# Patient Record
Sex: MALE | Race: WHITE
[De-identification: names, ages, dates, MRNs, and addresses within clinical notes are randomized per-mention and may not be internally consistent; named-entity substitution may affect disease eponyms.]

---

## 2018-07-30 ENCOUNTER — HOSPITAL ENCOUNTER (EMERGENCY)
Dept: HOSPITAL 26 - MED | Age: 28
Discharge: HOME | End: 2018-07-30
Payer: SELF-PAY

## 2018-07-30 VITALS — BODY MASS INDEX: 39.4 KG/M2 | WEIGHT: 260 LBS | HEIGHT: 68 IN

## 2018-07-30 VITALS — DIASTOLIC BLOOD PRESSURE: 81 MMHG | SYSTOLIC BLOOD PRESSURE: 116 MMHG

## 2018-07-30 VITALS — DIASTOLIC BLOOD PRESSURE: 56 MMHG | SYSTOLIC BLOOD PRESSURE: 99 MMHG
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
## 2018-07-30 DIAGNOSIS — R07.89: Primary | ICD-10-CM
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
## 2018-07-30 PROCEDURE — 93005 ELECTROCARDIOGRAM TRACING: CPT

## 2018-07-30 PROCEDURE — 96374 THER/PROPH/DIAG INJ IV PUSH: CPT

## 2018-07-30 PROCEDURE — 99284 EMERGENCY DEPT VISIT MOD MDM: CPT

## 2018-07-30 PROCEDURE — 71045 X-RAY EXAM CHEST 1 VIEW: CPT

## 2019-06-30 ENCOUNTER — HOSPITAL ENCOUNTER (EMERGENCY)
Dept: HOSPITAL 1 - ED | Age: 29
Discharge: HOME | End: 2019-06-30
Payer: MEDICAID

## 2019-06-30 VITALS
HEIGHT: 71 IN | WEIGHT: 230 LBS | WEIGHT: 230 LBS | HEIGHT: 71 IN | BODY MASS INDEX: 32.2 KG/M2 | BODY MASS INDEX: 32.2 KG/M2

## 2019-06-30 VITALS — SYSTOLIC BLOOD PRESSURE: 147 MMHG | DIASTOLIC BLOOD PRESSURE: 84 MMHG
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
## 2019-06-30 DIAGNOSIS — S93.402A: Primary | ICD-10-CM
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
## 2019-06-30 DIAGNOSIS — Z90.49: ICD-10-CM
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
## 2019-07-01 ENCOUNTER — HOSPITAL ENCOUNTER (EMERGENCY)
Dept: HOSPITAL 1 - ED | Age: 29
Discharge: HOME | End: 2019-07-01
Payer: MEDICAID

## 2019-07-01 VITALS — WEIGHT: 228 LBS | HEIGHT: 70 IN | BODY MASS INDEX: 32.64 KG/M2

## 2019-07-01 VITALS — DIASTOLIC BLOOD PRESSURE: 89 MMHG | SYSTOLIC BLOOD PRESSURE: 145 MMHG
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
## 2019-07-01 DIAGNOSIS — S93.402D: Primary | ICD-10-CM

## 2019-07-01 DIAGNOSIS — Z13.828: ICD-10-CM

## 2019-07-01 DIAGNOSIS — R03.0: ICD-10-CM

## 2019-07-02 ENCOUNTER — HOSPITAL ENCOUNTER (EMERGENCY)
Dept: HOSPITAL 1 - ED | Age: 29
Discharge: HOME | End: 2019-07-02
Payer: MEDICAID

## 2019-07-02 VITALS — BODY MASS INDEX: 31.95 KG/M2 | HEIGHT: 71 IN | WEIGHT: 228.25 LBS

## 2019-07-02 VITALS — DIASTOLIC BLOOD PRESSURE: 64 MMHG | SYSTOLIC BLOOD PRESSURE: 122 MMHG

## 2019-07-02 DIAGNOSIS — S99.912D: Primary | ICD-10-CM
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
## 2019-08-04 ENCOUNTER — HOSPITAL ENCOUNTER (INPATIENT)
Dept: HOSPITAL 1 - ED | Age: 29
LOS: 2 days | Discharge: LEFT BEFORE DISCHARGE | DRG: 243 | End: 2019-08-06
Attending: INTERNAL MEDICINE | Admitting: INTERNAL MEDICINE
Payer: MEDICAID

## 2019-08-04 VITALS
WEIGHT: 233 LBS | BODY MASS INDEX: 32.62 KG/M2 | BODY MASS INDEX: 32.62 KG/M2 | HEIGHT: 71 IN | WEIGHT: 233 LBS | HEIGHT: 71 IN

## 2019-08-04 VITALS — DIASTOLIC BLOOD PRESSURE: 56 MMHG | SYSTOLIC BLOOD PRESSURE: 105 MMHG

## 2019-08-04 VITALS — DIASTOLIC BLOOD PRESSURE: 82 MMHG | SYSTOLIC BLOOD PRESSURE: 129 MMHG

## 2019-08-04 DIAGNOSIS — K29.70: ICD-10-CM

## 2019-08-04 DIAGNOSIS — K21.9: Primary | ICD-10-CM

## 2019-08-04 LAB
ALBUMIN SERPL-MCNC: 3.9 G/DL (ref 3.4–5)
ALP SERPL-CCNC: 125 U/L (ref 46–116)
ALT SERPL-CCNC: 308 U/L (ref 16–63)
AMPHETAMINES UR QL SCN: (no result)
AST SERPL-CCNC: 249 U/L (ref 15–37)
BASOPHILS NFR BLD: 0.7 % (ref 0–2)
BILIRUB SERPL-MCNC: 1.3 MG/DL (ref 0.2–1)
BUN SERPL-MCNC: 13 MG/DL (ref 7–18)
CALCIUM SERPL-MCNC: 9 MG/DL (ref 8.5–10.1)
CHLORIDE SERPL-SCNC: 102 MMOL/L (ref 98–107)
CO2 SERPL-SCNC: 30 MMOL/L (ref 21–32)
CREAT SERPL-MCNC: 0.8 MG/DL (ref 0.7–1.3)
ERYTHROCYTE [DISTWIDTH] IN BLOOD BY AUTOMATED COUNT: 13.7 % (ref 11.5–14.5)
GFR SERPLBLD BASED ON 1.73 SQ M-ARVRAT: > 60 ML/MIN
GLUCOSE SERPL-MCNC: 93 MG/DL (ref 74–106)
LIPASE SERPL-CCNC: 118 IU/L (ref 73–393)
MICROSCOPIC UR-IMP: YES
PLATELET # BLD: 181 X10^3MCL (ref 130–400)
POTASSIUM SERPL-SCNC: 4.1 MMOL/L (ref 3.5–5.1)
PROT SERPL-MCNC: 8.1 G/DL (ref 6.4–8.2)
RBC # UR STRIP.AUTO: NEGATIVE /UL
SODIUM SERPL-SCNC: 141 MMOL/L (ref 136–145)
UA SPECIFIC GRAVITY: 1.01 (ref 1–1.03)

## 2019-08-04 PROCEDURE — G0378 HOSPITAL OBSERVATION PER HR: HCPCS

## 2019-08-04 NOTE — NUR
RECIEVED PT FROM DAY NURSE. PT RESTING IN BED COMFORTABLY. DENIES PAIN AT THIS
TIME. A/O X4, CALM AND COOPERATIVE. MS PT, DENIES CP, N/V, DIZZINESS, AND
PALPATATIONS. PALPABLE PULSES, NO EDEMA NOTED. BREATHING EVEN AND UNLABORED,
DENIES SOB. ABD SOFT AND ROUND, EPIGASTRIC PAIN TO PALPATION. LAC IV, CDI AND
INFUSING. BED AT LOWEST POSITION, CALL LIGHT WITHIN REACH, WILL CONTINUE TO
MONITOR.

## 2019-08-04 NOTE — NUR
PT RESTING IN BED TALKING ON THE PHONE. PT STATES ABD PAIN IS TOLERABLE AT
THIS TIME. IV INTACT AND PATENT. CALL LIGHT WITHIN REACH. WILL CONTINUE TO
MONITOR.

## 2019-08-04 NOTE — NUR
RECEIVED PT FROM ER, PT ADMIT FOR RECTAL BLEED, ABD PAIN, PT IS A/O X4, VERBAL
RESPONSIVE, ABLE TO TELL WHAT HE NEEDS. LUNG SOUND CLEAR BILATERAL, NO COUGH,
NO SOB, PT DENY ANY CHEST PAIN, BOWEL SOUND PRESENT ALL 4 QUADRANTS, NO
DISTENTION, PT C/O ABD PAIN AT MID AND RADIATE TO RIGHT UPPER QUARANT, ALSO PT
COMPLAIN HAD BLACK STOOL DAYS AGO. PEDAL PULSE PRESENT BOTH FEET, NO EDEMA, IV
AT LEFT AC, NO LEAKING, NO INFILTRATION.ALL ADLS ASSIST, ALL NEED MET, CALL
LIGHT IN REACH, WILL CONTINUE TO MONITOR.

## 2019-08-05 VITALS — SYSTOLIC BLOOD PRESSURE: 113 MMHG | DIASTOLIC BLOOD PRESSURE: 72 MMHG

## 2019-08-05 VITALS — SYSTOLIC BLOOD PRESSURE: 122 MMHG | DIASTOLIC BLOOD PRESSURE: 76 MMHG

## 2019-08-05 VITALS — DIASTOLIC BLOOD PRESSURE: 73 MMHG | SYSTOLIC BLOOD PRESSURE: 119 MMHG

## 2019-08-05 VITALS — DIASTOLIC BLOOD PRESSURE: 74 MMHG | SYSTOLIC BLOOD PRESSURE: 126 MMHG

## 2019-08-05 LAB
BASOPHILS NFR BLD: 0.4 % (ref 0–2)
BUN SERPL-MCNC: 7 MG/DL (ref 7–18)
CALCIUM SERPL-MCNC: 8.5 MG/DL (ref 8.5–10.1)
CHLORIDE SERPL-SCNC: 104 MMOL/L (ref 98–107)
CO2 SERPL-SCNC: 24.5 MMOL/L (ref 21–32)
CREAT SERPL-MCNC: 0.8 MG/DL (ref 0.7–1.3)
ERYTHROCYTE [DISTWIDTH] IN BLOOD BY AUTOMATED COUNT: 13.1 % (ref 11.5–14.5)
GFR SERPLBLD BASED ON 1.73 SQ M-ARVRAT: > 60 ML/MIN
GLUCOSE SERPL-MCNC: 79 MG/DL (ref 74–106)
PLATELET # BLD: 146 X10^3MCL (ref 130–400)
POTASSIUM SERPL-SCNC: 3.4 MMOL/L (ref 3.5–5.1)
SODIUM SERPL-SCNC: 140 MMOL/L (ref 136–145)

## 2019-08-05 NOTE — NUR
PT RESTING IN BED AT THIS TIME. DENIES PAIN OR DISCOMFORT. BREATHING E/U ON
RA. NO SIGNS OF ACUTE DISTRESS AT THIS TIME. BED AT LOWEST POSITION. CALL
LIGHT WITHIN REACH. WILL CONTINUE TO MONITOR.

## 2019-08-05 NOTE — NUR
PT RESTING IN BED COMFORTABLY. NO S/S OF PAIN AT THIS TIME. BREATHING E/U. NO
SIGNIFICANT CHANGES THIS THIS. BED AT LOWEST POSITION. CALL LIGHT WITHIN
REACH. WILL ENDORSE TO DAY NURSE.

## 2019-08-05 NOTE — NUR
RECEIVED PT IN BED, RESTING QUIETLY. A/O X4. RESP. EVEN AND UNLABORED. ON ROOM
AIR, NO ACUTE DISTRESS NOTED. AFEBRILE AND VITAL SIGNS STABLE. DENIES CP ,
ABD. PAIN OR ANY DISCOMFORT AT THIS TIME. IVF, NS AT 100ML/HR, INTACT AND
INFUSING VIA LAC, SITE CLEAR. AMBULATORY. NO COMPLAINTS NOTED AT THIS TIME.
CALL LIGHT WITHIN REACH. WILL CONTINUE TO MONITOR.

## 2019-08-05 NOTE — NUR
PT ENDORSE TO ME THIS MORNING. LAYING IN BED RESTING, AA/ OX4, BREATHING EVEN
AND AND UNLABORED ON RA, NO ACUTE RESP DISTRESS OR SOB NOTED. MEDSURG,DENIES
ANY CP OR PRESSURE. LAST BM 8/4 FORMED PER NIGHT NURSE. VOIDS FREELY. AMB. IV
TO THE RAC INTACT AND PATENT, INFUSING AT 100ML/ HR, NO REDNESS OR SWELLING
NOTED, WILL CONTINUE TO MONITOR.

## 2019-08-05 NOTE — NUR
FOR PROCEDURE IN AM. CONSENT OBTAINED FROM PT. PT ALSO INSTRUCTED TO MAINTAIN
NPO AFTER MN. PT VERBALIZED UNDERSTANDING. WILL CONTINUE TO MONITOR.

## 2019-08-06 VITALS — DIASTOLIC BLOOD PRESSURE: 73 MMHG | SYSTOLIC BLOOD PRESSURE: 127 MMHG

## 2019-08-06 VITALS — SYSTOLIC BLOOD PRESSURE: 120 MMHG | DIASTOLIC BLOOD PRESSURE: 79 MMHG

## 2019-08-06 LAB
ALBUMIN SERPL-MCNC: 3.5 G/DL (ref 3.4–5)
ALP SERPL-CCNC: 94 U/L (ref 46–116)
ALT SERPL-CCNC: 397 U/L (ref 16–63)
AST SERPL-CCNC: 328 U/L (ref 15–37)
BASOPHILS NFR BLD: 0.4 % (ref 0–2)
BILIRUB DIRECT SERPL-MCNC: 0.78 MG/DL (ref 0–0.2)
BILIRUB SERPL-MCNC: 1.6 MG/DL (ref 0.2–1)
BUN SERPL-MCNC: 8 MG/DL (ref 7–18)
CALCIUM SERPL-MCNC: 9.2 MG/DL (ref 8.5–10.1)
CHLORIDE SERPL-SCNC: 103 MMOL/L (ref 98–107)
CO2 SERPL-SCNC: 28.3 MMOL/L (ref 21–32)
CREAT SERPL-MCNC: 0.9 MG/DL (ref 0.7–1.3)
ERYTHROCYTE [DISTWIDTH] IN BLOOD BY AUTOMATED COUNT: 13.3 % (ref 11.5–14.5)
GFR SERPLBLD BASED ON 1.73 SQ M-ARVRAT: > 60 ML/MIN
GLUCOSE SERPL-MCNC: 84 MG/DL (ref 74–106)
PLATELET # BLD: 157 X10^3MCL (ref 130–400)
POTASSIUM SERPL-SCNC: 4 MMOL/L (ref 3.5–5.1)
PROT SERPL-MCNC: 7.4 G/DL (ref 6.4–8.2)
SODIUM SERPL-SCNC: 140 MMOL/L (ref 136–145)

## 2019-08-06 PROCEDURE — 0DB78ZX EXCISION OF STOMACH, PYLORUS, VIA NATURAL OR ARTIFICIAL OPENING ENDOSCOPIC, DIAGNOSTIC: ICD-10-PCS | Performed by: INTERNAL MEDICINE

## 2019-08-06 NOTE — NUR
PATIENT REQUESTING TO BE DISCHARGED HOME. SAMEER[facility] CLEMENT CALLED, PER NP PATIENT IS
NOT GOING TO BE DISCHARGED TODAY, IF PATIENT WANTS TO LEAVE HE WILL NEED TO
SIGN OUT AMA. PATIENT INFORMED, AND REQUESTED TO SIGN OUT AMA. AMA FORM
SIGNED. BONITA THOMPSON'JOSE JUAN. PATIENT LEFT AT THIS TIME. PATIENT'S SISTER TO PICK HIM UP TO
TAKE HIM HOME.

## 2019-08-06 NOTE — NUR
PT DOOZING OFF AND ON AT THIS TIME. STATES PAIN RELIEF. CALL LIGHT WITHIN
REACH. WILL CONTINUE TO MONITOR.

## 2019-08-06 NOTE — NUR
PATIENT GOING DOWN TO GI LAB FOR EGD VIA [facility] AT THIS TIME. IV HEPLOCKED,
PATIENT DENIES ANY PAIN OR RECTAL BLEEDING. NPO SINCE MIDNOC. WILL CONTINUE TO
MONITOR UPON RETURN TO THE FLOOR

## 2019-08-06 NOTE — NUR
SLEPT WELL. NO COMPLAINTS NOTED AT THIS TIME. AFEBRILE AND VITAL SIGNS
STABLE.RESP. EVEN AND UNLABORED. NO ACUTE DISTRESS NOTED. NPO MAINTAINED. FOR
PROCEDURE THIS AM. IVF INTACT AND INFUSING WELL. WILL ENDORSE TO INCOMING
NURSE.

## 2019-08-06 NOTE — NUR
PATIENT RETURNED FROM G.I. LAB VIA GURNEY. AWAKE, ALERT DENIES ANY PAIN OR
DISCOMFORT. NO N/V OR RECTAL BLEEDING NOTED. PATIENT NPO UNTIL LUNCH TODAY.
IVF DC'D AS ORDERED. PATIENT INSTRUCTED TO CALL FOR ASSIST.

## 2019-08-06 NOTE — NUR
PATIENT'S PLAN OF CARE WAS DISCUSSED AND REVIEWED WITH LVN:BARAK BERMUDEZ.
I HAVE REVIEWED THE DATA COLLECTION BY LVN (NAME):BARAK BERMUDEZ
ENTERED ON (DATE/TIME):8/6/19.
 
I CONCUR WITH THE DATA AND ANY EXCEPTIONS OR COMMENTS ARE LISTED BELOW:

## 2019-08-06 NOTE — NUR
RECEIVED PATIENT IN BED APPEARS TO BE RESTING, AROUSED EASILY TO NAME. NPO FOR
EGD THIS AM. PATIENT DENIES ANY RECTAL BLEEDING OR PAIN AT THIS TIME. IVF
INFUSIG WELL TO LEFT A/C. WILL CONTINUE TO MONITOR.

## 2019-08-06 NOTE — NUR
Initial Nutrition Assessment: 257/B TRICIA SALTER HR
 
Dx: Rectal bleed, abd pain
PMHx: none
PSHx: Cholecystectomy
Labs: AST 328H, ALT 397H
Meds: Pepcid, toradol, zofran
Diet: Regular
PO Intake: (8/4) 50% lunch, 100% breakfast (on clear liquid)
Ht: 180.34 cm (71")   Wt: 105.6 kg (232#)  BMI: 32.5 kg/m2Bed scale: 105.5 kg
IBW: 172# (78 kg) %IBW: 134  UBW: unable to access
Age: 29/M
Food Allergies: NKFA
Skin: intact  Coy: 23
Edema: none
GI: Last BM: 8/4
Trigger: N/V/D >3d, poor PO >3d
 
Per H&P, Pt is a 29-year old male who was seen and examined in the ER.
According to patient for about 2 weeks he has been having epigastric pain
associated with nausea and vomiting. Patient states that he noticed that he
was having some dark stools. Patient was states that he eats fast food and
puts lot of chili onto his food.
 
RDN Visit (8/6): Went twice to patient's room but patient was sleeping. Spoke
with RN Jose Daniel, she said that patient tolerated regular diet very well and
does not have any N/V/D/C. Patient has good appetite.
 
Problem with:  N/V/D/C: no
Problems with: Chewing/Swallowing: none
Current appetite: good (per RN)
Recent wt change: unable to access  %wt change: n/a
Vitamin/Supplement use: unable to access
Special diet at home: unable to access
Physical activity: unable to access
Nutrition education given: not possible at this time as patient was sleeping.
Food-drug interactions: none Education given: n/a
 
Estimated Nutritional Needs Based on adjusted body weight 85 kg
 Energy: 6571-1385 kcal/d (25-30 kcal/kg)
 Protein:  g/d (1.0-1.2 g/kg) - preserve LBM
 Fluid: 7887-9255 ml/d (1 ml/kcal) or per doctor
 
Nutrition Diagnosis
1. Altered nutrition related lab values related to endocrine dysfunction as
evidenced by AST 328H
Intervention
1. Recommend continuing regular diet as tolerated.
Monitor/Evaluate
 Goal: PO intake at least 75% of estimated needs
 Monitor: PO intake, Labs, GI function
 F/U in 7 days as low risk 8/13

## 2019-08-06 NOTE — NUR
RESTING QUIETLY IN BED, WITH EYES CLOSED, APPEARS ASLEEP, EASILY AROUSABLE.
RESP. EVEN AND UNLABORED. NO ACUTE DISTRESS NOTED. IVF INTACT AND INFUSING
WELL, SITE CLEAR. CALL LIGHT WITHIN REACH. WILL CONTINUE TO MONITOR.

## 2020-03-06 ENCOUNTER — HOSPITAL ENCOUNTER (EMERGENCY)
Dept: HOSPITAL 1 - ED | Age: 30
Discharge: HOME | End: 2020-03-06
Payer: COMMERCIAL

## 2020-03-06 VITALS — SYSTOLIC BLOOD PRESSURE: 126 MMHG | DIASTOLIC BLOOD PRESSURE: 63 MMHG

## 2020-03-06 VITALS
WEIGHT: 228 LBS | BODY MASS INDEX: 34.56 KG/M2 | HEIGHT: 68 IN | HEIGHT: 68 IN | WEIGHT: 228 LBS | BODY MASS INDEX: 34.56 KG/M2

## 2020-03-06 DIAGNOSIS — K29.20: Primary | ICD-10-CM

## 2020-03-06 LAB
ALBUMIN SERPL-MCNC: 3.5 G/DL (ref 3.4–5)
ALP SERPL-CCNC: 161 U/L (ref 46–116)
ALT SERPL-CCNC: 231 U/L (ref 16–63)
AST SERPL-CCNC: 353 U/L (ref 15–37)
BASOPHILS NFR BLD: 0.1 % (ref 0–2)
BILIRUB SERPL-MCNC: 1.5 MG/DL (ref 0.2–1)
BUN SERPL-MCNC: 8 MG/DL (ref 7–18)
CALCIUM SERPL-MCNC: 9 MG/DL (ref 8.5–10.1)
CHLORIDE SERPL-SCNC: 98 MMOL/L (ref 98–107)
CO2 SERPL-SCNC: 28.8 MMOL/L (ref 21–32)
CREAT SERPL-MCNC: 0.9 MG/DL (ref 0.7–1.3)
ERYTHROCYTE [DISTWIDTH] IN BLOOD BY AUTOMATED COUNT: 14.1 % (ref 11.5–14.5)
GFR SERPLBLD BASED ON 1.73 SQ M-ARVRAT: > 60 ML/MIN
GLUCOSE SERPL-MCNC: 132 MG/DL (ref 74–106)
LIPASE SERPL-CCNC: 118 IU/L (ref 73–393)
PLATELET # BLD: 163 X10^3MCL (ref 130–400)
POTASSIUM SERPL-SCNC: 3.9 MMOL/L (ref 3.5–5.1)
PROT SERPL-MCNC: 9.6 G/DL (ref 6.4–8.2)
SODIUM SERPL-SCNC: 137 MMOL/L (ref 136–145)

## 2020-04-23 ENCOUNTER — HOSPITAL ENCOUNTER (INPATIENT)
Dept: HOSPITAL 1 - ED | Age: 30
LOS: 2 days | Discharge: HOME | DRG: 280 | End: 2020-04-25
Attending: INTERNAL MEDICINE | Admitting: INTERNAL MEDICINE
Payer: COMMERCIAL

## 2020-04-23 VITALS
HEIGHT: 71 IN | WEIGHT: 226 LBS | WEIGHT: 226 LBS | BODY MASS INDEX: 31.64 KG/M2 | HEIGHT: 71 IN | BODY MASS INDEX: 31.64 KG/M2

## 2020-04-23 VITALS — SYSTOLIC BLOOD PRESSURE: 126 MMHG | DIASTOLIC BLOOD PRESSURE: 80 MMHG
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
## 2020-04-23 DIAGNOSIS — K70.30: ICD-10-CM

## 2020-04-23 DIAGNOSIS — K70.10: Primary | ICD-10-CM

## 2020-04-23 DIAGNOSIS — F10.20: ICD-10-CM

## 2020-04-23 DIAGNOSIS — Z90.49: ICD-10-CM

## 2020-04-23 LAB
ALBUMIN SERPL-MCNC: 2.4 G/DL (ref 3.4–5)
ALP SERPL-CCNC: 299 U/L (ref 46–116)
ALT SERPL-CCNC: 52 U/L (ref 16–63)
AST SERPL-CCNC: 264 U/L (ref 15–37)
BASOPHILS NFR BLD: 0.7 % (ref 0–2)
BILIRUB SERPL-MCNC: 6.63 MG/DL (ref 0.2–1)
BUN SERPL-MCNC: 9 MG/DL (ref 7–18)
CALCIUM SERPL-MCNC: 7.8 MG/DL (ref 8.5–10.1)
CHLORIDE SERPL-SCNC: 99 MMOL/L (ref 98–107)
CO2 SERPL-SCNC: 27.3 MMOL/L (ref 21–32)
CREAT SERPL-MCNC: 0.8 MG/DL (ref 0.7–1.3)
ERYTHROCYTE [DISTWIDTH] IN BLOOD BY AUTOMATED COUNT: 16.9 % (ref 11.5–14.5)
GFR SERPLBLD BASED ON 1.73 SQ M-ARVRAT: > 60 ML/MIN
GLUCOSE SERPL-MCNC: 82 MG/DL (ref 74–106)
LIPASE SERPL-CCNC: 87 IU/L (ref 73–393)
MICROSCOPIC UR-IMP: YES
PLATELET # BLD: 234 X10^3MCL (ref 130–400)
POTASSIUM SERPL-SCNC: 3.2 MMOL/L (ref 3.5–5.1)
PROT SERPL-MCNC: 8.3 G/DL (ref 6.4–8.2)
RBC # UR STRIP.AUTO: NEGATIVE /UL
SODIUM SERPL-SCNC: 136 MMOL/L (ref 136–145)
UA SPECIFIC GRAVITY: 1.02 (ref 1–1.03)

## 2020-04-23 PROCEDURE — G0378 HOSPITAL OBSERVATION PER HR: HCPCS

## 2020-04-24 VITALS — SYSTOLIC BLOOD PRESSURE: 117 MMHG | DIASTOLIC BLOOD PRESSURE: 75 MMHG

## 2020-04-24 VITALS — DIASTOLIC BLOOD PRESSURE: 75 MMHG | SYSTOLIC BLOOD PRESSURE: 121 MMHG

## 2020-04-24 VITALS — SYSTOLIC BLOOD PRESSURE: 115 MMHG | DIASTOLIC BLOOD PRESSURE: 72 MMHG

## 2020-04-24 VITALS — SYSTOLIC BLOOD PRESSURE: 120 MMHG | DIASTOLIC BLOOD PRESSURE: 73 MMHG

## 2020-04-24 VITALS — SYSTOLIC BLOOD PRESSURE: 117 MMHG | DIASTOLIC BLOOD PRESSURE: 70 MMHG

## 2020-04-24 LAB
ALBUMIN SERPL-MCNC: 2.2 G/DL (ref 3.4–5)
ALP SERPL-CCNC: 273 U/L (ref 46–116)
ALT SERPL-CCNC: 47 U/L (ref 16–63)
AST SERPL-CCNC: 213 U/L (ref 15–37)
BILIRUB SERPL-MCNC: 7.84 MG/DL (ref 0.2–1)
BUN SERPL-MCNC: 7 MG/DL (ref 7–18)
CALCIUM SERPL-MCNC: 8.4 MG/DL (ref 8.5–10.1)
CHLORIDE SERPL-SCNC: 98 MMOL/L (ref 98–107)
CO2 SERPL-SCNC: 29.4 MMOL/L (ref 21–32)
CREAT SERPL-MCNC: 0.8 MG/DL (ref 0.7–1.3)
GFR SERPLBLD BASED ON 1.73 SQ M-ARVRAT: > 60 ML/MIN
GLUCOSE SERPL-MCNC: 124 MG/DL (ref 74–106)
POTASSIUM SERPL-SCNC: 3.9 MMOL/L (ref 3.5–5.1)
PROT SERPL-MCNC: 8.1 G/DL (ref 6.4–8.2)
SODIUM SERPL-SCNC: 133 MMOL/L (ref 136–145)

## 2020-04-25 VITALS — SYSTOLIC BLOOD PRESSURE: 112 MMHG | DIASTOLIC BLOOD PRESSURE: 66 MMHG

## 2020-04-25 VITALS — SYSTOLIC BLOOD PRESSURE: 110 MMHG | DIASTOLIC BLOOD PRESSURE: 65 MMHG

## 2020-04-25 LAB
ALBUMIN SERPL-MCNC: 2.1 G/DL (ref 3.4–5)
ALP SERPL-CCNC: 252 U/L (ref 46–116)
ALT SERPL-CCNC: 42 U/L (ref 16–63)
AST SERPL-CCNC: 175 U/L (ref 15–37)
BASOPHILS NFR BLD: 0.1 % (ref 0–2)
BILIRUB SERPL-MCNC: 6.74 MG/DL (ref 0.2–1)
BUN SERPL-MCNC: 7 MG/DL (ref 7–18)
CALCIUM SERPL-MCNC: 8.1 MG/DL (ref 8.5–10.1)
CHLORIDE SERPL-SCNC: 99 MMOL/L (ref 98–107)
CO2 SERPL-SCNC: 28.2 MMOL/L (ref 21–32)
CREAT SERPL-MCNC: 0.7 MG/DL (ref 0.7–1.3)
ERYTHROCYTE [DISTWIDTH] IN BLOOD BY AUTOMATED COUNT: 17.3 % (ref 11.5–14.5)
GFR SERPLBLD BASED ON 1.73 SQ M-ARVRAT: > 60 ML/MIN
GLUCOSE SERPL-MCNC: 97 MG/DL (ref 74–106)
PLATELET # BLD: 219 X10^3MCL (ref 130–400)
POTASSIUM SERPL-SCNC: 3.9 MMOL/L (ref 3.5–5.1)
PROT SERPL-MCNC: 7.8 G/DL (ref 6.4–8.2)
SODIUM SERPL-SCNC: 132 MMOL/L (ref 136–145)

## 2020-04-30 ENCOUNTER — HOSPITAL ENCOUNTER (EMERGENCY)
Dept: HOSPITAL 1 - ED | Age: 30
Discharge: HOME | End: 2020-04-30
Payer: COMMERCIAL

## 2020-04-30 VITALS — DIASTOLIC BLOOD PRESSURE: 70 MMHG | SYSTOLIC BLOOD PRESSURE: 119 MMHG

## 2020-04-30 VITALS
WEIGHT: 220 LBS | HEIGHT: 71 IN | BODY MASS INDEX: 30.8 KG/M2 | WEIGHT: 220 LBS | HEIGHT: 71 IN | BODY MASS INDEX: 30.8 KG/M2

## 2020-04-30 DIAGNOSIS — K70.10: Primary | ICD-10-CM

## 2020-04-30 DIAGNOSIS — Z90.49: ICD-10-CM

## 2020-04-30 LAB
ALBUMIN SERPL-MCNC: 2 G/DL (ref 3.4–5)
ALP SERPL-CCNC: 226 U/L (ref 46–116)
ALT SERPL-CCNC: 58 U/L (ref 16–63)
AST SERPL-CCNC: 186 U/L (ref 15–37)
BASOPHILS NFR BLD: 0.1 % (ref 0–2)
BILIRUB SERPL-MCNC: 11.34 MG/DL (ref 0.2–1)
BUN SERPL-MCNC: 12 MG/DL (ref 7–18)
CALCIUM SERPL-MCNC: 8.4 MG/DL (ref 8.5–10.1)
CHLORIDE SERPL-SCNC: 98 MMOL/L (ref 98–107)
CO2 SERPL-SCNC: 26.6 MMOL/L (ref 21–32)
CREAT SERPL-MCNC: 0.9 MG/DL (ref 0.7–1.3)
ERYTHROCYTE [DISTWIDTH] IN BLOOD BY AUTOMATED COUNT: 17.8 % (ref 11.5–14.5)
GFR SERPLBLD BASED ON 1.73 SQ M-ARVRAT: > 60 ML/MIN
GLUCOSE SERPL-MCNC: 97 MG/DL (ref 74–106)
PLATELET # BLD: 302 X10^3MCL (ref 130–400)
POTASSIUM SERPL-SCNC: 3.6 MMOL/L (ref 3.5–5.1)
PROT SERPL-MCNC: 7.9 G/DL (ref 6.4–8.2)
SODIUM SERPL-SCNC: 131 MMOL/L (ref 136–145)

## 2020-05-11 ENCOUNTER — HOSPITAL ENCOUNTER (INPATIENT)
Dept: HOSPITAL 1 - ED | Age: 30
LOS: 6 days | Discharge: LEFT BEFORE DISCHARGE | DRG: 720 | End: 2020-05-17
Attending: INTERNAL MEDICINE | Admitting: INTERNAL MEDICINE
Payer: COMMERCIAL

## 2020-05-11 VITALS
HEIGHT: 71 IN | WEIGHT: 180 LBS | BODY MASS INDEX: 25.2 KG/M2 | WEIGHT: 180 LBS | HEIGHT: 71 IN | BODY MASS INDEX: 25.2 KG/M2

## 2020-05-11 VITALS — DIASTOLIC BLOOD PRESSURE: 61 MMHG | SYSTOLIC BLOOD PRESSURE: 112 MMHG

## 2020-05-11 VITALS — SYSTOLIC BLOOD PRESSURE: 93 MMHG | DIASTOLIC BLOOD PRESSURE: 53 MMHG

## 2020-05-11 VITALS — DIASTOLIC BLOOD PRESSURE: 53 MMHG | SYSTOLIC BLOOD PRESSURE: 93 MMHG

## 2020-05-11 DIAGNOSIS — K70.10: ICD-10-CM

## 2020-05-11 DIAGNOSIS — Z53.29: ICD-10-CM

## 2020-05-11 DIAGNOSIS — Z20.828: ICD-10-CM

## 2020-05-11 DIAGNOSIS — J18.9: ICD-10-CM

## 2020-05-11 DIAGNOSIS — A41.9: Primary | ICD-10-CM

## 2020-05-11 DIAGNOSIS — Z90.49: ICD-10-CM

## 2020-05-11 LAB
ALBUMIN SERPL-MCNC: 1.4 G/DL (ref 3.4–5)
ALP SERPL-CCNC: 174 U/L (ref 46–116)
ALT SERPL-CCNC: 42 U/L (ref 16–63)
AST SERPL-CCNC: 151 U/L (ref 15–37)
BASOPHILS NFR BLD: 0 % (ref 0–2)
BILIRUB SERPL-MCNC: 15.5 MG/DL (ref 0.2–1)
BUN SERPL-MCNC: 8 MG/DL (ref 7–18)
CALCIUM SERPL-MCNC: 7.9 MG/DL (ref 8.5–10.1)
CHLORIDE SERPL-SCNC: 98 MMOL/L (ref 98–107)
CO2 SERPL-SCNC: 26.4 MMOL/L (ref 21–32)
CREAT SERPL-MCNC: 1 MG/DL (ref 0.7–1.3)
ERYTHROCYTE [DISTWIDTH] IN BLOOD BY AUTOMATED COUNT: 18.6 % (ref 11.5–14.5)
GFR SERPLBLD BASED ON 1.73 SQ M-ARVRAT: > 60 ML/MIN
GLUCOSE SERPL-MCNC: 108 MG/DL (ref 74–106)
LIPASE SERPL-CCNC: 141 IU/L (ref 73–393)
MONOCYTES NFR BLD: 3 % (ref 0–7)
NEUTS BAND NFR BLD: 3 % (ref 0–10)
NEUTS SEG NFR BLD MANUAL: 88 % (ref 37–75)
PLATELET # BLD: 407 X10^3MCL (ref 130–400)
POTASSIUM SERPL-SCNC: 3.8 MMOL/L (ref 3.5–5.1)
PROT SERPL-MCNC: 7.2 G/DL (ref 6.4–8.2)
RBC MORPH BLD: (no result)
SODIUM SERPL-SCNC: 132 MMOL/L (ref 136–145)

## 2020-05-11 PROCEDURE — G0480 DRUG TEST DEF 1-7 CLASSES: HCPCS

## 2020-05-11 PROCEDURE — P9047 ALBUMIN (HUMAN), 25%, 50ML: HCPCS

## 2020-05-11 PROCEDURE — C9113 INJ PANTOPRAZOLE SODIUM, VIA: HCPCS

## 2020-05-11 PROCEDURE — G0378 HOSPITAL OBSERVATION PER HR: HCPCS

## 2020-05-12 VITALS — DIASTOLIC BLOOD PRESSURE: 62 MMHG | SYSTOLIC BLOOD PRESSURE: 107 MMHG

## 2020-05-12 VITALS — SYSTOLIC BLOOD PRESSURE: 113 MMHG | DIASTOLIC BLOOD PRESSURE: 69 MMHG

## 2020-05-12 VITALS — DIASTOLIC BLOOD PRESSURE: 68 MMHG | SYSTOLIC BLOOD PRESSURE: 110 MMHG

## 2020-05-12 VITALS — SYSTOLIC BLOOD PRESSURE: 104 MMHG | DIASTOLIC BLOOD PRESSURE: 48 MMHG

## 2020-05-12 VITALS — DIASTOLIC BLOOD PRESSURE: 55 MMHG | SYSTOLIC BLOOD PRESSURE: 100 MMHG

## 2020-05-12 LAB
BUN SERPL-MCNC: 8 MG/DL (ref 7–18)
CALCIUM SERPL-MCNC: 7.6 MG/DL (ref 8.5–10.1)
CHLORIDE SERPL-SCNC: 101 MMOL/L (ref 98–107)
CO2 SERPL-SCNC: 25.2 MMOL/L (ref 21–32)
CREAT SERPL-MCNC: 0.8 MG/DL (ref 0.7–1.3)
ERYTHROCYTE [DISTWIDTH] IN BLOOD BY AUTOMATED COUNT: 18.4 % (ref 11.5–14.5)
GFR SERPLBLD BASED ON 1.73 SQ M-ARVRAT: > 60 ML/MIN
GLUCOSE SERPL-MCNC: 84 MG/DL (ref 74–106)
MONOCYTES NFR BLD: 4 % (ref 0–7)
NEUTS BAND NFR BLD: 4 % (ref 0–10)
NEUTS SEG NFR BLD MANUAL: 85 % (ref 37–75)
PLATELET # BLD: 350 X10^3MCL (ref 130–400)
POTASSIUM SERPL-SCNC: 3.7 MMOL/L (ref 3.5–5.1)
RBC MORPH BLD: (no result)
SODIUM SERPL-SCNC: 134 MMOL/L (ref 136–145)

## 2020-05-13 VITALS — SYSTOLIC BLOOD PRESSURE: 106 MMHG | DIASTOLIC BLOOD PRESSURE: 67 MMHG

## 2020-05-13 VITALS — DIASTOLIC BLOOD PRESSURE: 73 MMHG | SYSTOLIC BLOOD PRESSURE: 105 MMHG

## 2020-05-13 VITALS — DIASTOLIC BLOOD PRESSURE: 73 MMHG | SYSTOLIC BLOOD PRESSURE: 106 MMHG

## 2020-05-13 VITALS — SYSTOLIC BLOOD PRESSURE: 107 MMHG | DIASTOLIC BLOOD PRESSURE: 57 MMHG

## 2020-05-13 VITALS — DIASTOLIC BLOOD PRESSURE: 71 MMHG | SYSTOLIC BLOOD PRESSURE: 118 MMHG

## 2020-05-13 LAB
ALBUMIN SERPL-MCNC: 1.4 G/DL (ref 3.4–5)
ALP SERPL-CCNC: 168 U/L (ref 46–116)
ALT SERPL-CCNC: 38 U/L (ref 16–63)
AST SERPL-CCNC: 148 U/L (ref 15–37)
BILIRUB SERPL-MCNC: 14.6 MG/DL (ref 0.2–1)
BUN SERPL-MCNC: 8 MG/DL (ref 7–18)
CALCIUM SERPL-MCNC: 7.7 MG/DL (ref 8.5–10.1)
CHLORIDE SERPL-SCNC: 99 MMOL/L (ref 98–107)
CO2 SERPL-SCNC: 25.3 MMOL/L (ref 21–32)
CREAT SERPL-MCNC: 0.8 MG/DL (ref 0.7–1.3)
ERYTHROCYTE [DISTWIDTH] IN BLOOD BY AUTOMATED COUNT: 18.7 % (ref 11.5–14.5)
GFR SERPLBLD BASED ON 1.73 SQ M-ARVRAT: > 60 ML/MIN
GLUCOSE SERPL-MCNC: 79 MG/DL (ref 74–106)
MONOCYTES NFR BLD: 5 % (ref 0–7)
NEUTS BAND NFR BLD: 1 % (ref 0–10)
NEUTS SEG NFR BLD MANUAL: 90 % (ref 37–75)
PLAT MORPH BLD: (no result)
PLATELET # BLD: 361 X10^3MCL (ref 130–400)
POTASSIUM SERPL-SCNC: 3.7 MMOL/L (ref 3.5–5.1)
PROT SERPL-MCNC: 6.8 G/DL (ref 6.4–8.2)
RBC MORPH BLD: (no result)
SODIUM SERPL-SCNC: 134 MMOL/L (ref 136–145)

## 2020-05-14 VITALS — DIASTOLIC BLOOD PRESSURE: 65 MMHG | SYSTOLIC BLOOD PRESSURE: 113 MMHG

## 2020-05-14 VITALS — SYSTOLIC BLOOD PRESSURE: 110 MMHG | DIASTOLIC BLOOD PRESSURE: 61 MMHG

## 2020-05-14 VITALS — DIASTOLIC BLOOD PRESSURE: 64 MMHG | SYSTOLIC BLOOD PRESSURE: 110 MMHG

## 2020-05-14 VITALS — SYSTOLIC BLOOD PRESSURE: 114 MMHG | DIASTOLIC BLOOD PRESSURE: 66 MMHG

## 2020-05-14 LAB
ALBUMIN SERPL-MCNC: 1.3 G/DL (ref 3.4–5)
ALP SERPL-CCNC: 158 U/L (ref 46–116)
ALT SERPL-CCNC: 38 U/L (ref 16–63)
AST SERPL-CCNC: 144 U/L (ref 15–37)
BILIRUB SERPL-MCNC: 13.9 MG/DL (ref 0.2–1)
BUN SERPL-MCNC: 9 MG/DL (ref 7–18)
CALCIUM SERPL-MCNC: 7.9 MG/DL (ref 8.5–10.1)
CHLORIDE SERPL-SCNC: 100 MMOL/L (ref 98–107)
CO2 SERPL-SCNC: 25.2 MMOL/L (ref 21–32)
CREAT SERPL-MCNC: 0.9 MG/DL (ref 0.7–1.3)
DACRYOCYTES BLD QL SMEAR: (no result)
ERYTHROCYTE [DISTWIDTH] IN BLOOD BY AUTOMATED COUNT: 17.7 % (ref 11.5–14.5)
GFR SERPLBLD BASED ON 1.73 SQ M-ARVRAT: > 60 ML/MIN
GLUCOSE SERPL-MCNC: 82 MG/DL (ref 74–106)
LIPASE SERPL-CCNC: 113 IU/L (ref 73–393)
MONOCYTES NFR BLD: 4 % (ref 0–7)
NEUTS BAND NFR BLD: 4 % (ref 0–10)
NEUTS SEG NFR BLD MANUAL: 89 % (ref 37–75)
PLAT MORPH BLD: (no result)
PLATELET # BLD: 403 X10^3MCL (ref 130–400)
POTASSIUM SERPL-SCNC: 3.4 MMOL/L (ref 3.5–5.1)
PROT SERPL-MCNC: 6.5 G/DL (ref 6.4–8.2)
RBC MORPH BLD: (no result)
SODIUM SERPL-SCNC: 134 MMOL/L (ref 136–145)

## 2020-05-15 VITALS — DIASTOLIC BLOOD PRESSURE: 63 MMHG | SYSTOLIC BLOOD PRESSURE: 116 MMHG

## 2020-05-15 VITALS — SYSTOLIC BLOOD PRESSURE: 110 MMHG | DIASTOLIC BLOOD PRESSURE: 70 MMHG

## 2020-05-15 VITALS — SYSTOLIC BLOOD PRESSURE: 116 MMHG | DIASTOLIC BLOOD PRESSURE: 71 MMHG

## 2020-05-15 VITALS — DIASTOLIC BLOOD PRESSURE: 68 MMHG | SYSTOLIC BLOOD PRESSURE: 115 MMHG

## 2020-05-15 VITALS — SYSTOLIC BLOOD PRESSURE: 110 MMHG | DIASTOLIC BLOOD PRESSURE: 62 MMHG

## 2020-05-15 VITALS — DIASTOLIC BLOOD PRESSURE: 71 MMHG | SYSTOLIC BLOOD PRESSURE: 11 MMHG

## 2020-05-15 LAB
BASOPHILS NFR BLD: 0 % (ref 0–2)
BUN SERPL-MCNC: 8 MG/DL (ref 7–18)
CALCIUM SERPL-MCNC: 7.7 MG/DL (ref 8.5–10.1)
CHLORIDE SERPL-SCNC: 101 MMOL/L (ref 98–107)
CO2 SERPL-SCNC: 24.7 MMOL/L (ref 21–32)
CREAT SERPL-MCNC: 0.9 MG/DL (ref 0.7–1.3)
ERYTHROCYTE [DISTWIDTH] IN BLOOD BY AUTOMATED COUNT: 18.4 % (ref 11.5–14.5)
GFR SERPLBLD BASED ON 1.73 SQ M-ARVRAT: > 60 ML/MIN
GLUCOSE SERPL-MCNC: 94 MG/DL (ref 74–106)
MONOCYTES NFR BLD: 7 % (ref 0–7)
NEUTS BAND NFR BLD: 1 % (ref 0–10)
NEUTS SEG NFR BLD MANUAL: 78 % (ref 37–75)
PLAT MORPH BLD: (no result)
PLATELET # BLD: 332 X10^3MCL (ref 130–400)
POTASSIUM SERPL-SCNC: 4.1 MMOL/L (ref 3.5–5.1)
RBC MORPH BLD: (no result)
SODIUM SERPL-SCNC: 133 MMOL/L (ref 136–145)

## 2020-05-16 VITALS — SYSTOLIC BLOOD PRESSURE: 119 MMHG | DIASTOLIC BLOOD PRESSURE: 78 MMHG

## 2020-05-16 VITALS — DIASTOLIC BLOOD PRESSURE: 60 MMHG | SYSTOLIC BLOOD PRESSURE: 119 MMHG

## 2020-05-16 VITALS — DIASTOLIC BLOOD PRESSURE: 78 MMHG | SYSTOLIC BLOOD PRESSURE: 122 MMHG

## 2020-05-16 VITALS — SYSTOLIC BLOOD PRESSURE: 119 MMHG | DIASTOLIC BLOOD PRESSURE: 71 MMHG

## 2020-05-16 VITALS — DIASTOLIC BLOOD PRESSURE: 63 MMHG | SYSTOLIC BLOOD PRESSURE: 114 MMHG

## 2020-05-16 LAB
ALBUMIN SERPL-MCNC: 1.4 G/DL (ref 3.4–5)
ALP SERPL-CCNC: 167 U/L (ref 46–116)
ALT SERPL-CCNC: 39 U/L (ref 16–63)
AST SERPL-CCNC: 177 U/L (ref 15–37)
BILIRUB SERPL-MCNC: 15.5 MG/DL (ref 0.2–1)
BUN SERPL-MCNC: 7 MG/DL (ref 7–18)
CALCIUM SERPL-MCNC: 7.9 MG/DL (ref 8.5–10.1)
CHLORIDE SERPL-SCNC: 100 MMOL/L (ref 98–107)
CO2 SERPL-SCNC: 21.3 MMOL/L (ref 21–32)
CREAT SERPL-MCNC: 0.9 MG/DL (ref 0.7–1.3)
ERYTHROCYTE [DISTWIDTH] IN BLOOD BY AUTOMATED COUNT: 18.7 % (ref 11.5–14.5)
GFR SERPLBLD BASED ON 1.73 SQ M-ARVRAT: > 60 ML/MIN
GLUCOSE SERPL-MCNC: 85 MG/DL (ref 74–106)
MONOCYTES NFR BLD: 5 % (ref 0–7)
NEUTS BAND NFR BLD: 7 % (ref 0–10)
NEUTS SEG NFR BLD MANUAL: 79 % (ref 37–75)
PLAT MORPH BLD: (no result)
PLATELET # BLD: 345 X10^3MCL (ref 130–400)
POTASSIUM SERPL-SCNC: 4.1 MMOL/L (ref 3.5–5.1)
PROT SERPL-MCNC: 7.1 G/DL (ref 6.4–8.2)
RBC MORPH BLD: (no result)
SODIUM SERPL-SCNC: 132 MMOL/L (ref 136–145)

## 2020-05-17 VITALS — SYSTOLIC BLOOD PRESSURE: 111 MMHG | DIASTOLIC BLOOD PRESSURE: 73 MMHG

## 2020-05-17 VITALS — SYSTOLIC BLOOD PRESSURE: 109 MMHG | DIASTOLIC BLOOD PRESSURE: 66 MMHG

## 2020-05-17 VITALS — SYSTOLIC BLOOD PRESSURE: 140 MMHG | DIASTOLIC BLOOD PRESSURE: 65 MMHG

## 2020-05-17 VITALS — SYSTOLIC BLOOD PRESSURE: 118 MMHG | DIASTOLIC BLOOD PRESSURE: 74 MMHG

## 2020-05-17 LAB
BUN SERPL-MCNC: 10 MG/DL (ref 7–18)
CALCIUM SERPL-MCNC: 7.8 MG/DL (ref 8.5–10.1)
CHLORIDE SERPL-SCNC: 104 MMOL/L (ref 98–107)
CO2 SERPL-SCNC: 22.9 MMOL/L (ref 21–32)
CREAT SERPL-MCNC: 0.8 MG/DL (ref 0.7–1.3)
ERYTHROCYTE [DISTWIDTH] IN BLOOD BY AUTOMATED COUNT: 18.8 % (ref 11.5–14.5)
GFR SERPLBLD BASED ON 1.73 SQ M-ARVRAT: > 60 ML/MIN
GLUCOSE SERPL-MCNC: 102 MG/DL (ref 74–106)
MONOCYTES NFR BLD: 3 % (ref 0–7)
NEUTS BAND NFR BLD: 5 % (ref 0–10)
NEUTS SEG NFR BLD MANUAL: 82 % (ref 37–75)
PLAT MORPH BLD: (no result)
PLATELET # BLD: 331 X10^3MCL (ref 130–400)
POTASSIUM SERPL-SCNC: 4.6 MMOL/L (ref 3.5–5.1)
RBC MORPH BLD: (no result)
SODIUM SERPL-SCNC: 135 MMOL/L (ref 136–145)

## 2020-05-21 ENCOUNTER — HOSPITAL ENCOUNTER (INPATIENT)
Dept: HOSPITAL 1 - ED | Age: 30
LOS: 8 days | Discharge: ANOTHER HOSPITAL | DRG: 279 | End: 2020-05-29
Attending: INTERNAL MEDICINE | Admitting: INTERNAL MEDICINE
Payer: COMMERCIAL

## 2020-05-21 VITALS
WEIGHT: 224.06 LBS | WEIGHT: 224.06 LBS | BODY MASS INDEX: 31.37 KG/M2 | HEIGHT: 71 IN | HEIGHT: 71 IN | BODY MASS INDEX: 31.37 KG/M2

## 2020-05-21 VITALS — DIASTOLIC BLOOD PRESSURE: 69 MMHG | SYSTOLIC BLOOD PRESSURE: 116 MMHG

## 2020-05-21 DIAGNOSIS — K70.30: ICD-10-CM

## 2020-05-21 DIAGNOSIS — E44.0: ICD-10-CM

## 2020-05-21 DIAGNOSIS — Z79.899: ICD-10-CM

## 2020-05-21 DIAGNOSIS — Z20.828: ICD-10-CM
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
## 2020-05-21 DIAGNOSIS — J98.11: ICD-10-CM

## 2020-05-21 DIAGNOSIS — Z23: ICD-10-CM

## 2020-05-21 DIAGNOSIS — K72.90: Primary | ICD-10-CM

## 2020-05-21 DIAGNOSIS — E80.6: ICD-10-CM

## 2020-05-21 DIAGNOSIS — F10.10: ICD-10-CM

## 2020-05-21 DIAGNOSIS — J18.9: ICD-10-CM

## 2020-05-21 DIAGNOSIS — Z90.49: ICD-10-CM

## 2020-05-21 DIAGNOSIS — N17.9: ICD-10-CM

## 2020-05-21 DIAGNOSIS — K70.10: ICD-10-CM

## 2020-05-21 DIAGNOSIS — D72.823: ICD-10-CM

## 2020-05-21 LAB
ALBUMIN SERPL-MCNC: 1.4 G/DL (ref 3.4–5)
ALP SERPL-CCNC: 180 U/L (ref 46–116)
ALT SERPL-CCNC: 41 U/L (ref 16–63)
AST SERPL-CCNC: 132 U/L (ref 15–37)
BASOPHILS NFR BLD: 0 % (ref 0–2)
BILIRUB SERPL-MCNC: 18.14 MG/DL (ref 0.2–1)
BUN SERPL-MCNC: 12 MG/DL (ref 7–18)
CALCIUM SERPL-MCNC: 7.9 MG/DL (ref 8.5–10.1)
CHLORIDE SERPL-SCNC: 101 MMOL/L (ref 98–107)
CHOLEST SERPL-MCNC: 76 MG/DL (ref ?–200)
CO2 SERPL-SCNC: 23.5 MMOL/L (ref 21–32)
CREAT SERPL-MCNC: 1.2 MG/DL (ref 0.7–1.3)
ERYTHROCYTE [DISTWIDTH] IN BLOOD BY AUTOMATED COUNT: 18.8 % (ref 11.5–14.5)
GFR SERPLBLD BASED ON 1.73 SQ M-ARVRAT: > 60 ML/MIN
GLUCOSE SERPL-MCNC: 105 MG/DL (ref 74–106)
HDLC SERPL-MCNC: 10 MG/DL (ref 40–60)
MICROSCOPIC UR-IMP: YES
NEUTS BAND NFR BLD: 3 % (ref 0–10)
NEUTS SEG NFR BLD MANUAL: 83 % (ref 37–75)
PLATELET # BLD: 311 X10^3MCL (ref 130–400)
POTASSIUM SERPL-SCNC: 3.8 MMOL/L (ref 3.5–5.1)
PROT SERPL-MCNC: 6.7 G/DL (ref 6.4–8.2)
RBC # UR STRIP.AUTO: NEGATIVE /UL
RBC MORPH BLD: (no result)
SODIUM SERPL-SCNC: 135 MMOL/L (ref 136–145)
UA SPECIFIC GRAVITY: 1.02 (ref 1–1.03)

## 2020-05-21 PROCEDURE — C9113 INJ PANTOPRAZOLE SODIUM, VIA: HCPCS

## 2020-05-21 PROCEDURE — G0378 HOSPITAL OBSERVATION PER HR: HCPCS

## 2020-05-21 PROCEDURE — P9047 ALBUMIN (HUMAN), 25%, 50ML: HCPCS

## 2020-05-22 VITALS — DIASTOLIC BLOOD PRESSURE: 63 MMHG | SYSTOLIC BLOOD PRESSURE: 107 MMHG

## 2020-05-22 VITALS — DIASTOLIC BLOOD PRESSURE: 51 MMHG | SYSTOLIC BLOOD PRESSURE: 108 MMHG

## 2020-05-22 VITALS — DIASTOLIC BLOOD PRESSURE: 61 MMHG | SYSTOLIC BLOOD PRESSURE: 105 MMHG

## 2020-05-22 VITALS — DIASTOLIC BLOOD PRESSURE: 64 MMHG | SYSTOLIC BLOOD PRESSURE: 102 MMHG

## 2020-05-22 VITALS — SYSTOLIC BLOOD PRESSURE: 99 MMHG | DIASTOLIC BLOOD PRESSURE: 61 MMHG

## 2020-05-22 LAB
ALBUMIN SERPL-MCNC: 1.2 G/DL (ref 3.4–5)
ALP SERPL-CCNC: 164 U/L (ref 46–116)
ALT SERPL-CCNC: 35 U/L (ref 16–63)
AMYLASE SERPL-CCNC: 34 U/L (ref 25–115)
AST SERPL-CCNC: 122 U/L (ref 15–37)
BILIRUB DIRECT SERPL-MCNC: 13.52 MG/DL (ref 0–0.2)
BILIRUB SERPL-MCNC: 16.4 MG/DL (ref 0.2–1)
BUN SERPL-MCNC: 12 MG/DL (ref 7–18)
CALCIUM SERPL-MCNC: 7.7 MG/DL (ref 8.5–10.1)
CHLORIDE SERPL-SCNC: 103 MMOL/L (ref 98–107)
CO2 SERPL-SCNC: 24.1 MMOL/L (ref 21–32)
CREAT SERPL-MCNC: 1 MG/DL (ref 0.7–1.3)
ERYTHROCYTE [DISTWIDTH] IN BLOOD BY AUTOMATED COUNT: 18.3 % (ref 11.5–14.5)
GFR SERPLBLD BASED ON 1.73 SQ M-ARVRAT: > 60 ML/MIN
GLUCOSE SERPL-MCNC: 83 MG/DL (ref 74–106)
LIPASE SERPL-CCNC: 150 IU/L (ref 73–393)
MAGNESIUM SERPL-MCNC: 2.3 MG/DL (ref 1.8–2.4)
MONOCYTES NFR BLD: 4 % (ref 0–7)
NEUTS BAND NFR BLD: 2 % (ref 0–10)
NEUTS SEG NFR BLD MANUAL: 84 % (ref 37–75)
PLATELET # BLD: 256 X10^3MCL (ref 130–400)
POTASSIUM SERPL-SCNC: 3.8 MMOL/L (ref 3.5–5.1)
PROT SERPL-MCNC: 6 G/DL (ref 6.4–8.2)
RBC MORPH BLD: (no result)
SODIUM SERPL-SCNC: 136 MMOL/L (ref 136–145)

## 2020-05-23 VITALS — DIASTOLIC BLOOD PRESSURE: 62 MMHG | SYSTOLIC BLOOD PRESSURE: 107 MMHG

## 2020-05-23 VITALS — SYSTOLIC BLOOD PRESSURE: 102 MMHG | DIASTOLIC BLOOD PRESSURE: 61 MMHG

## 2020-05-23 VITALS — DIASTOLIC BLOOD PRESSURE: 65 MMHG | SYSTOLIC BLOOD PRESSURE: 108 MMHG

## 2020-05-23 VITALS — DIASTOLIC BLOOD PRESSURE: 58 MMHG | SYSTOLIC BLOOD PRESSURE: 102 MMHG

## 2020-05-23 VITALS — DIASTOLIC BLOOD PRESSURE: 67 MMHG | SYSTOLIC BLOOD PRESSURE: 110 MMHG

## 2020-05-24 VITALS — DIASTOLIC BLOOD PRESSURE: 67 MMHG | SYSTOLIC BLOOD PRESSURE: 117 MMHG

## 2020-05-24 VITALS — SYSTOLIC BLOOD PRESSURE: 119 MMHG | DIASTOLIC BLOOD PRESSURE: 68 MMHG

## 2020-05-24 VITALS — DIASTOLIC BLOOD PRESSURE: 62 MMHG | SYSTOLIC BLOOD PRESSURE: 109 MMHG

## 2020-05-24 VITALS — SYSTOLIC BLOOD PRESSURE: 115 MMHG | DIASTOLIC BLOOD PRESSURE: 58 MMHG

## 2020-05-24 VITALS — DIASTOLIC BLOOD PRESSURE: 63 MMHG | SYSTOLIC BLOOD PRESSURE: 111 MMHG

## 2020-05-24 LAB
ALBUMIN SERPL-MCNC: 1.2 G/DL (ref 3.4–5)
ALP SERPL-CCNC: 173 U/L (ref 46–116)
ALT SERPL-CCNC: 33 U/L (ref 16–63)
AST SERPL-CCNC: 117 U/L (ref 15–37)
BASOPHILS NFR BLD: 0 % (ref 0–2)
BILIRUB DIRECT SERPL-MCNC: 14.55 MG/DL (ref 0–0.2)
BILIRUB SERPL-MCNC: 17.2 MG/DL (ref 0.2–1)
BUN SERPL-MCNC: 13 MG/DL (ref 7–18)
CALCIUM SERPL-MCNC: 7.4 MG/DL (ref 8.5–10.1)
CHLORIDE SERPL-SCNC: 104 MMOL/L (ref 98–107)
CO2 SERPL-SCNC: 23 MMOL/L (ref 21–32)
CREAT SERPL-MCNC: 1.3 MG/DL (ref 0.7–1.3)
ERYTHROCYTE [DISTWIDTH] IN BLOOD BY AUTOMATED COUNT: 19.4 % (ref 11.5–14.5)
GFR SERPLBLD BASED ON 1.73 SQ M-ARVRAT: > 60 ML/MIN
GLUCOSE SERPL-MCNC: 86 MG/DL (ref 74–106)
MAGNESIUM SERPL-MCNC: 2 MG/DL (ref 1.8–2.4)
MONOCYTES NFR BLD: 9 % (ref 0–7)
NEUTS BAND NFR BLD: 1 % (ref 0–10)
NEUTS SEG NFR BLD MANUAL: 86 % (ref 37–75)
PLAT MORPH BLD: (no result)
PLATELET # BLD: 268 X10^3MCL (ref 130–400)
POTASSIUM SERPL-SCNC: 3.6 MMOL/L (ref 3.5–5.1)
PROT SERPL-MCNC: 5.7 G/DL (ref 6.4–8.2)
RBC MORPH BLD: (no result)
SODIUM SERPL-SCNC: 134 MMOL/L (ref 136–145)

## 2020-05-25 VITALS — SYSTOLIC BLOOD PRESSURE: 108 MMHG | DIASTOLIC BLOOD PRESSURE: 55 MMHG

## 2020-05-25 VITALS — DIASTOLIC BLOOD PRESSURE: 50 MMHG | SYSTOLIC BLOOD PRESSURE: 96 MMHG

## 2020-05-25 VITALS — DIASTOLIC BLOOD PRESSURE: 66 MMHG | SYSTOLIC BLOOD PRESSURE: 106 MMHG

## 2020-05-25 VITALS — SYSTOLIC BLOOD PRESSURE: 118 MMHG | DIASTOLIC BLOOD PRESSURE: 66 MMHG

## 2020-05-25 VITALS — SYSTOLIC BLOOD PRESSURE: 110 MMHG | DIASTOLIC BLOOD PRESSURE: 64 MMHG

## 2020-05-25 VITALS — DIASTOLIC BLOOD PRESSURE: 63 MMHG | SYSTOLIC BLOOD PRESSURE: 110 MMHG

## 2020-05-25 LAB
ALBUMIN SERPL-MCNC: 1.1 G/DL (ref 3.4–5)
ALP SERPL-CCNC: 195 U/L (ref 46–116)
ALT SERPL-CCNC: 31 U/L (ref 16–63)
AST SERPL-CCNC: 112 U/L (ref 15–37)
BILIRUB SERPL-MCNC: 17.99 MG/DL (ref 0.2–1)
BUN SERPL-MCNC: 17 MG/DL (ref 7–18)
CALCIUM SERPL-MCNC: 7.4 MG/DL (ref 8.5–10.1)
CHLORIDE SERPL-SCNC: 104 MMOL/L (ref 98–107)
CO2 SERPL-SCNC: 21.8 MMOL/L (ref 21–32)
CREAT SERPL-MCNC: 1.3 MG/DL (ref 0.7–1.3)
ERYTHROCYTE [DISTWIDTH] IN BLOOD BY AUTOMATED COUNT: 19 % (ref 11.5–14.5)
GFR SERPLBLD BASED ON 1.73 SQ M-ARVRAT: > 60 ML/MIN
GLUCOSE SERPL-MCNC: 82 MG/DL (ref 74–106)
MAGNESIUM SERPL-MCNC: 2.3 MG/DL (ref 1.8–2.4)
MONOCYTES NFR BLD: 4 % (ref 0–7)
NEUTS BAND NFR BLD: 2 % (ref 0–10)
NEUTS SEG NFR BLD MANUAL: 87 % (ref 37–75)
PLATELET # BLD: 274 X10^3MCL (ref 130–400)
POTASSIUM SERPL-SCNC: 3.9 MMOL/L (ref 3.5–5.1)
PROT SERPL-MCNC: 6.2 G/DL (ref 6.4–8.2)
RBC MORPH BLD: (no result)
SODIUM SERPL-SCNC: 135 MMOL/L (ref 136–145)

## 2020-05-26 VITALS — DIASTOLIC BLOOD PRESSURE: 62 MMHG | SYSTOLIC BLOOD PRESSURE: 105 MMHG

## 2020-05-26 VITALS — SYSTOLIC BLOOD PRESSURE: 116 MMHG | DIASTOLIC BLOOD PRESSURE: 71 MMHG

## 2020-05-26 VITALS — DIASTOLIC BLOOD PRESSURE: 56 MMHG | SYSTOLIC BLOOD PRESSURE: 101 MMHG

## 2020-05-26 VITALS — SYSTOLIC BLOOD PRESSURE: 101 MMHG | DIASTOLIC BLOOD PRESSURE: 52 MMHG

## 2020-05-26 VITALS — SYSTOLIC BLOOD PRESSURE: 110 MMHG | DIASTOLIC BLOOD PRESSURE: 68 MMHG

## 2020-05-26 LAB
ALBUMIN SERPL-MCNC: 1.1 G/DL (ref 3.4–5)
ALP SERPL-CCNC: 177 U/L (ref 46–116)
ALT SERPL-CCNC: 31 U/L (ref 16–63)
AST SERPL-CCNC: 110 U/L (ref 15–37)
BASOPHILS NFR BLD: 0 % (ref 0–2)
BILIRUB SERPL-MCNC: 18.45 MG/DL (ref 0.2–1)
BUN SERPL-MCNC: 22 MG/DL (ref 7–18)
CALCIUM SERPL-MCNC: 7.6 MG/DL (ref 8.5–10.1)
CHLORIDE SERPL-SCNC: 103 MMOL/L (ref 98–107)
CO2 SERPL-SCNC: 21.5 MMOL/L (ref 21–32)
CREAT SERPL-MCNC: 1.6 MG/DL (ref 0.7–1.3)
ERYTHROCYTE [DISTWIDTH] IN BLOOD BY AUTOMATED COUNT: 18.4 % (ref 11.5–14.5)
GFR SERPLBLD BASED ON 1.73 SQ M-ARVRAT: 55 ML/MIN
GLUCOSE SERPL-MCNC: 74 MG/DL (ref 74–106)
MAGNESIUM SERPL-MCNC: 2.3 MG/DL (ref 1.8–2.4)
MONOCYTES NFR BLD: 7 % (ref 0–7)
NEUTS BAND NFR BLD: 0 % (ref 0–10)
NEUTS SEG NFR BLD MANUAL: 91 % (ref 37–75)
PLAT MORPH BLD: (no result)
PLATELET # BLD: 274 X10^3MCL (ref 130–400)
POTASSIUM SERPL-SCNC: 3.9 MMOL/L (ref 3.5–5.1)
PROT SERPL-MCNC: 6 G/DL (ref 6.4–8.2)
RBC MORPH BLD: (no result)
SODIUM SERPL-SCNC: 135 MMOL/L (ref 136–145)

## 2020-05-27 VITALS — SYSTOLIC BLOOD PRESSURE: 115 MMHG | DIASTOLIC BLOOD PRESSURE: 66 MMHG

## 2020-05-27 VITALS — SYSTOLIC BLOOD PRESSURE: 117 MMHG | DIASTOLIC BLOOD PRESSURE: 76 MMHG

## 2020-05-27 VITALS — DIASTOLIC BLOOD PRESSURE: 62 MMHG | SYSTOLIC BLOOD PRESSURE: 105 MMHG

## 2020-05-27 VITALS — DIASTOLIC BLOOD PRESSURE: 77 MMHG | SYSTOLIC BLOOD PRESSURE: 122 MMHG

## 2020-05-27 VITALS — SYSTOLIC BLOOD PRESSURE: 118 MMHG | DIASTOLIC BLOOD PRESSURE: 72 MMHG

## 2020-05-27 LAB
ALBUMIN SERPL-MCNC: 1.2 G/DL (ref 3.4–5)
ALP SERPL-CCNC: 169 U/L (ref 46–116)
ALT SERPL-CCNC: 28 U/L (ref 16–63)
AST SERPL-CCNC: 66 U/L (ref 15–37)
BASOPHILS NFR BLD: 0 % (ref 0–2)
BILIRUB SERPL-MCNC: 18.3 MG/DL (ref 0.2–1)
BUN SERPL-MCNC: 30 MG/DL (ref 7–18)
CALCIUM SERPL-MCNC: 7.6 MG/DL (ref 8.5–10.1)
CHLORIDE SERPL-SCNC: 105 MMOL/L (ref 98–107)
CO2 SERPL-SCNC: 20.8 MMOL/L (ref 21–32)
CREAT SERPL-MCNC: 2.1 MG/DL (ref 0.7–1.3)
ERYTHROCYTE [DISTWIDTH] IN BLOOD BY AUTOMATED COUNT: 18.7 % (ref 11.5–14.5)
GFR SERPLBLD BASED ON 1.73 SQ M-ARVRAT: 40 ML/MIN
GLUCOSE SERPL-MCNC: 129 MG/DL (ref 74–106)
MONOCYTES NFR BLD: 5 % (ref 0–7)
NEUTS BAND NFR BLD: 3 % (ref 0–10)
NEUTS SEG NFR BLD MANUAL: 86 % (ref 37–75)
PLAT MORPH BLD: (no result)
PLATELET # BLD: 276 X10^3MCL (ref 130–400)
POTASSIUM SERPL-SCNC: 4.2 MMOL/L (ref 3.5–5.1)
PROT SERPL-MCNC: 5.8 G/DL (ref 6.4–8.2)
RBC MORPH BLD: (no result)
SODIUM SERPL-SCNC: 136 MMOL/L (ref 136–145)

## 2020-05-28 VITALS — DIASTOLIC BLOOD PRESSURE: 78 MMHG | SYSTOLIC BLOOD PRESSURE: 124 MMHG

## 2020-05-28 VITALS — SYSTOLIC BLOOD PRESSURE: 108 MMHG | DIASTOLIC BLOOD PRESSURE: 64 MMHG

## 2020-05-28 VITALS — DIASTOLIC BLOOD PRESSURE: 84 MMHG | SYSTOLIC BLOOD PRESSURE: 135 MMHG

## 2020-05-28 VITALS — DIASTOLIC BLOOD PRESSURE: 70 MMHG | SYSTOLIC BLOOD PRESSURE: 122 MMHG

## 2020-05-28 LAB
ALBUMIN SERPL-MCNC: 1.7 G/DL (ref 3.4–5)
ALP SERPL-CCNC: 202 U/L (ref 46–116)
ALT SERPL-CCNC: 34 U/L (ref 16–63)
AST SERPL-CCNC: 88 U/L (ref 15–37)
BILIRUB SERPL-MCNC: 19.6 MG/DL (ref 0.2–1)
BUN SERPL-MCNC: 43 MG/DL (ref 7–18)
CALCIUM SERPL-MCNC: 7.4 MG/DL (ref 8.5–10.1)
CHLORIDE SERPL-SCNC: 104 MMOL/L (ref 98–107)
CO2 SERPL-SCNC: 19.9 MMOL/L (ref 21–32)
CREAT SERPL-MCNC: 2.6 MG/DL (ref 0.7–1.3)
ERYTHROCYTE [DISTWIDTH] IN BLOOD BY AUTOMATED COUNT: 19 % (ref 11.5–14.5)
GFR SERPLBLD BASED ON 1.73 SQ M-ARVRAT: 32 ML/MIN
GLUCOSE SERPL-MCNC: 114 MG/DL (ref 74–106)
MICROSCOPIC UR-IMP: YES
MONOCYTES NFR BLD: 4 % (ref 0–7)
NEUTS BAND NFR BLD: 6 % (ref 0–10)
NEUTS SEG NFR BLD MANUAL: 82 % (ref 37–75)
PLATELET # BLD: 311 X10^3MCL (ref 130–400)
POTASSIUM SERPL-SCNC: 4.5 MMOL/L (ref 3.5–5.1)
PROT SERPL-MCNC: 6.7 G/DL (ref 6.4–8.2)
RBC # UR STRIP.AUTO: NEGATIVE /UL
RBC MORPH BLD: (no result)
SODIUM SERPL-SCNC: 138 MMOL/L (ref 136–145)
UA SPECIFIC GRAVITY: 1.02 (ref 1–1.03)

## 2020-05-29 VITALS — DIASTOLIC BLOOD PRESSURE: 74 MMHG | SYSTOLIC BLOOD PRESSURE: 124 MMHG

## 2020-05-29 VITALS — SYSTOLIC BLOOD PRESSURE: 132 MMHG | DIASTOLIC BLOOD PRESSURE: 78 MMHG

## 2020-05-29 VITALS — DIASTOLIC BLOOD PRESSURE: 65 MMHG | SYSTOLIC BLOOD PRESSURE: 114 MMHG

## 2020-05-29 VITALS — DIASTOLIC BLOOD PRESSURE: 85 MMHG | SYSTOLIC BLOOD PRESSURE: 135 MMHG

## 2020-05-29 LAB
ALBUMIN SERPL-MCNC: 2 G/DL (ref 3.4–5)
ALP SERPL-CCNC: 194 U/L (ref 46–116)
ALT SERPL-CCNC: 42 U/L (ref 16–63)
AST SERPL-CCNC: 101 U/L (ref 15–37)
BASOPHILS NFR BLD: 0 % (ref 0–2)
BILIRUB SERPL-MCNC: 16.77 MG/DL (ref 0.2–1)
BUN SERPL-MCNC: 52 MG/DL (ref 7–18)
CALCIUM SERPL-MCNC: 7.7 MG/DL (ref 8.5–10.1)
CHLORIDE SERPL-SCNC: 105 MMOL/L (ref 98–107)
CO2 SERPL-SCNC: 20.1 MMOL/L (ref 21–32)
CREAT SERPL-MCNC: 2.9 MG/DL (ref 0.7–1.3)
ERYTHROCYTE [DISTWIDTH] IN BLOOD BY AUTOMATED COUNT: 19.2 % (ref 11.5–14.5)
GFR SERPLBLD BASED ON 1.73 SQ M-ARVRAT: 28 ML/MIN
GLUCOSE SERPL-MCNC: 106 MG/DL (ref 74–106)
MONOCYTES NFR BLD: 4 % (ref 0–7)
NEUTS BAND NFR BLD: 5 % (ref 0–10)
NEUTS SEG NFR BLD MANUAL: 82 % (ref 37–75)
PLAT MORPH BLD: (no result)
PLATELET # BLD: 269 X10^3MCL (ref 130–400)
POTASSIUM SERPL-SCNC: 4.6 MMOL/L (ref 3.5–5.1)
PROT SERPL-MCNC: 6.3 G/DL (ref 6.4–8.2)
RBC MORPH BLD: (no result)
SODIUM SERPL-SCNC: 136 MMOL/L (ref 136–145)

## 2020-06-03 ENCOUNTER — HOSPITAL ENCOUNTER (INPATIENT)
Dept: HOSPITAL 1 - MU | Age: 30
LOS: 3 days | Discharge: HOME | DRG: 280 | End: 2020-06-06
Attending: INTERNAL MEDICINE | Admitting: INTERNAL MEDICINE
Payer: COMMERCIAL

## 2020-06-03 VITALS — SYSTOLIC BLOOD PRESSURE: 132 MMHG | DIASTOLIC BLOOD PRESSURE: 92 MMHG

## 2020-06-03 VITALS — WEIGHT: 240.13 LBS | HEIGHT: 71 IN | BODY MASS INDEX: 33.62 KG/M2

## 2020-06-03 DIAGNOSIS — D72.829: ICD-10-CM

## 2020-06-03 DIAGNOSIS — F10.10: ICD-10-CM

## 2020-06-03 DIAGNOSIS — N17.0: ICD-10-CM

## 2020-06-03 DIAGNOSIS — N18.9: ICD-10-CM

## 2020-06-03 DIAGNOSIS — J96.01: ICD-10-CM

## 2020-06-03 DIAGNOSIS — D64.9: ICD-10-CM

## 2020-06-03 DIAGNOSIS — K70.31: ICD-10-CM

## 2020-06-03 DIAGNOSIS — K70.40: Primary | ICD-10-CM

## 2020-06-03 DIAGNOSIS — Z79.899: ICD-10-CM

## 2020-06-03 DIAGNOSIS — I50.9: ICD-10-CM

## 2020-06-03 DIAGNOSIS — K70.11: ICD-10-CM

## 2020-06-03 PROCEDURE — G0378 HOSPITAL OBSERVATION PER HR: HCPCS

## 2020-06-03 PROCEDURE — P9047 ALBUMIN (HUMAN), 25%, 50ML: HCPCS

## 2020-06-04 VITALS — SYSTOLIC BLOOD PRESSURE: 116 MMHG | DIASTOLIC BLOOD PRESSURE: 61 MMHG

## 2020-06-04 VITALS — SYSTOLIC BLOOD PRESSURE: 111 MMHG | DIASTOLIC BLOOD PRESSURE: 53 MMHG

## 2020-06-04 VITALS — DIASTOLIC BLOOD PRESSURE: 61 MMHG | SYSTOLIC BLOOD PRESSURE: 116 MMHG

## 2020-06-04 VITALS — DIASTOLIC BLOOD PRESSURE: 78 MMHG | SYSTOLIC BLOOD PRESSURE: 130 MMHG

## 2020-06-04 VITALS — SYSTOLIC BLOOD PRESSURE: 123 MMHG | DIASTOLIC BLOOD PRESSURE: 69 MMHG

## 2020-06-04 LAB
BASOPHILS NFR BLD: 0 % (ref 0–2)
BUN SERPL-MCNC: 53 MG/DL (ref 7–18)
CALCIUM SERPL-MCNC: 7.9 MG/DL (ref 8.5–10.1)
CHLORIDE SERPL-SCNC: 108 MMOL/L (ref 98–107)
CO2 SERPL-SCNC: 19.6 MMOL/L (ref 21–32)
CREAT SERPL-MCNC: 2 MG/DL (ref 0.7–1.3)
DACRYOCYTES BLD QL SMEAR: (no result)
ERYTHROCYTE [DISTWIDTH] IN BLOOD BY AUTOMATED COUNT: 18.9 % (ref 11.5–14.5)
GFR SERPLBLD BASED ON 1.73 SQ M-ARVRAT: 43 ML/MIN
GLUCOSE SERPL-MCNC: 93 MG/DL (ref 74–106)
MICROSCOPIC UR-IMP: YES
MONOCYTES NFR BLD: 4 % (ref 0–7)
NEUTS BAND NFR BLD: 17 % (ref 0–10)
NEUTS SEG NFR BLD MANUAL: 73 % (ref 37–75)
PLAT MORPH BLD: (no result)
PLATELET # BLD: 232 X10^3MCL (ref 130–400)
POTASSIUM SERPL-SCNC: 4.4 MMOL/L (ref 3.5–5.1)
RBC # UR STRIP.AUTO: (no result) /UL
RBC MORPH BLD: (no result)
SODIUM SERPL-SCNC: 141 MMOL/L (ref 136–145)
TARGETS BLD QL SMEAR: (no result)
UA SPECIFIC GRAVITY: 1.01 (ref 1–1.03)

## 2020-06-05 VITALS — DIASTOLIC BLOOD PRESSURE: 65 MMHG | SYSTOLIC BLOOD PRESSURE: 112 MMHG

## 2020-06-05 VITALS — DIASTOLIC BLOOD PRESSURE: 57 MMHG | SYSTOLIC BLOOD PRESSURE: 109 MMHG

## 2020-06-05 VITALS — SYSTOLIC BLOOD PRESSURE: 103 MMHG | DIASTOLIC BLOOD PRESSURE: 53 MMHG

## 2020-06-05 VITALS — DIASTOLIC BLOOD PRESSURE: 59 MMHG | SYSTOLIC BLOOD PRESSURE: 107 MMHG

## 2020-06-05 VITALS — DIASTOLIC BLOOD PRESSURE: 60 MMHG | SYSTOLIC BLOOD PRESSURE: 106 MMHG

## 2020-06-05 LAB
ALBUMIN SERPL-MCNC: 2.5 G/DL (ref 3.4–5)
ALP SERPL-CCNC: 159 U/L (ref 46–116)
ALT SERPL-CCNC: 172 U/L (ref 16–63)
AST SERPL-CCNC: 363 U/L (ref 15–37)
BILIRUB SERPL-MCNC: 19.7 MG/DL (ref 0.2–1)
BUN SERPL-MCNC: 50 MG/DL (ref 7–18)
CALCIUM SERPL-MCNC: 8.1 MG/DL (ref 8.5–10.1)
CHLORIDE SERPL-SCNC: 106 MMOL/L (ref 98–107)
CO2 SERPL-SCNC: 21.5 MMOL/L (ref 21–32)
CREAT SERPL-MCNC: 2.3 MG/DL (ref 0.7–1.3)
ERYTHROCYTE [DISTWIDTH] IN BLOOD BY AUTOMATED COUNT: 19 % (ref 11.5–14.5)
GFR SERPLBLD BASED ON 1.73 SQ M-ARVRAT: 36 ML/MIN
GLUCOSE SERPL-MCNC: 85 MG/DL (ref 74–106)
MAGNESIUM SERPL-MCNC: 1.8 MG/DL (ref 1.8–2.4)
NEUTS BAND NFR BLD: 41 % (ref 0–10)
NEUTS SEG NFR BLD MANUAL: 3 % (ref 37–75)
PHOSPHATE SERPL-MCNC: 5 MG/DL (ref 2.5–4.9)
PLATELET # BLD: 204 X10^3MCL (ref 130–400)
POTASSIUM SERPL-SCNC: 4.1 MMOL/L (ref 3.5–5.1)
PROT SERPL-MCNC: 6.1 G/DL (ref 6.4–8.2)
RBC MORPH BLD: (no result)
SODIUM SERPL-SCNC: 143 MMOL/L (ref 136–145)

## 2020-06-06 VITALS — DIASTOLIC BLOOD PRESSURE: 65 MMHG | SYSTOLIC BLOOD PRESSURE: 116 MMHG

## 2020-06-06 VITALS — SYSTOLIC BLOOD PRESSURE: 123 MMHG | DIASTOLIC BLOOD PRESSURE: 68 MMHG

## 2020-06-06 VITALS — SYSTOLIC BLOOD PRESSURE: 111 MMHG | DIASTOLIC BLOOD PRESSURE: 72 MMHG

## 2020-06-06 LAB
ALBUMIN SERPL-MCNC: 2.9 G/DL (ref 3.4–5)
ALP SERPL-CCNC: 168 U/L (ref 46–116)
ALT SERPL-CCNC: 140 U/L (ref 16–63)
AST SERPL-CCNC: 129 U/L (ref 15–37)
BASOPHILS NFR BLD: 0 % (ref 0–2)
BILIRUB SERPL-MCNC: 20.44 MG/DL (ref 0.2–1)
BUN SERPL-MCNC: 57 MG/DL (ref 7–18)
CALCIUM SERPL-MCNC: 8.4 MG/DL (ref 8.5–10.1)
CHLORIDE SERPL-SCNC: 98 MMOL/L (ref 98–107)
CO2 SERPL-SCNC: 30.1 MMOL/L (ref 21–32)
CREAT SERPL-MCNC: 2.7 MG/DL (ref 0.7–1.3)
ERYTHROCYTE [DISTWIDTH] IN BLOOD BY AUTOMATED COUNT: 18.7 % (ref 11.5–14.5)
GFR SERPLBLD BASED ON 1.73 SQ M-ARVRAT: 30 ML/MIN
GLUCOSE SERPL-MCNC: 104 MG/DL (ref 74–106)
MAGNESIUM SERPL-MCNC: 1.9 MG/DL (ref 1.8–2.4)
MONOCYTES NFR BLD: 7 % (ref 0–7)
NEUTS BAND NFR BLD: 1 % (ref 0–10)
NEUTS SEG NFR BLD MANUAL: 90 % (ref 37–75)
PHOSPHATE SERPL-MCNC: 5.8 MG/DL (ref 2.5–4.9)
PLAT MORPH BLD: (no result)
PLATELET # BLD: 191 X10^3MCL (ref 130–400)
POTASSIUM SERPL-SCNC: 3.4 MMOL/L (ref 3.5–5.1)
PROT SERPL-MCNC: 6.2 G/DL (ref 6.4–8.2)
RBC MORPH BLD: (no result)
SODIUM SERPL-SCNC: 138 MMOL/L (ref 136–145)
TARGETS BLD QL SMEAR: (no result)

## 2020-07-14 ENCOUNTER — HOSPITAL ENCOUNTER (INPATIENT)
Dept: HOSPITAL 1 - ED | Age: 30
LOS: 1 days | Discharge: LEFT BEFORE DISCHARGE | DRG: 242 | End: 2020-07-15
Attending: HOSPITALIST | Admitting: HOSPITALIST
Payer: COMMERCIAL

## 2020-07-14 VITALS
BODY MASS INDEX: 28.48 KG/M2 | WEIGHT: 192.31 LBS | BODY MASS INDEX: 28.48 KG/M2 | WEIGHT: 192.31 LBS | HEIGHT: 69 IN | HEIGHT: 69 IN

## 2020-07-14 DIAGNOSIS — E80.6: ICD-10-CM

## 2020-07-14 DIAGNOSIS — I85.01: Primary | ICD-10-CM

## 2020-07-14 DIAGNOSIS — R79.89: ICD-10-CM

## 2020-07-14 DIAGNOSIS — N17.0: ICD-10-CM

## 2020-07-14 DIAGNOSIS — I10: ICD-10-CM

## 2020-07-14 DIAGNOSIS — K70.30: ICD-10-CM

## 2020-07-14 DIAGNOSIS — Z53.29: ICD-10-CM

## 2020-07-14 DIAGNOSIS — D64.9: ICD-10-CM

## 2020-07-14 DIAGNOSIS — K76.7: ICD-10-CM

## 2020-07-14 DIAGNOSIS — R74.0: ICD-10-CM

## 2020-07-14 DIAGNOSIS — Z90.49: ICD-10-CM

## 2020-07-14 DIAGNOSIS — E87.1: ICD-10-CM

## 2020-07-14 DIAGNOSIS — Z79.899: ICD-10-CM

## 2020-07-14 PROCEDURE — G0378 HOSPITAL OBSERVATION PER HR: HCPCS

## 2020-07-14 PROCEDURE — C9113 INJ PANTOPRAZOLE SODIUM, VIA: HCPCS

## 2020-07-14 PROCEDURE — G0480 DRUG TEST DEF 1-7 CLASSES: HCPCS

## 2020-07-15 VITALS — DIASTOLIC BLOOD PRESSURE: 76 MMHG | SYSTOLIC BLOOD PRESSURE: 110 MMHG

## 2020-07-15 VITALS — SYSTOLIC BLOOD PRESSURE: 113 MMHG | DIASTOLIC BLOOD PRESSURE: 63 MMHG

## 2020-07-15 VITALS — DIASTOLIC BLOOD PRESSURE: 55 MMHG | SYSTOLIC BLOOD PRESSURE: 102 MMHG

## 2020-07-15 VITALS — SYSTOLIC BLOOD PRESSURE: 97 MMHG | DIASTOLIC BLOOD PRESSURE: 63 MMHG

## 2020-07-15 VITALS — SYSTOLIC BLOOD PRESSURE: 105 MMHG | DIASTOLIC BLOOD PRESSURE: 55 MMHG

## 2020-07-15 LAB
ALBUMIN SERPL-MCNC: 3.6 G/DL (ref 3.4–5)
ALP SERPL-CCNC: 215 U/L (ref 46–116)
ALT SERPL-CCNC: 80 U/L (ref 16–63)
AMYLASE SERPL-CCNC: 89 U/L (ref 25–115)
AST SERPL-CCNC: 91 U/L (ref 15–37)
BASOPHILS NFR BLD: 0.4 % (ref 0–2)
BASOPHILS NFR BLD: 0.4 % (ref 0–2)
BILIRUB SERPL-MCNC: 7.19 MG/DL (ref 0.2–1)
BUN SERPL-MCNC: 18 MG/DL (ref 7–18)
BUN SERPL-MCNC: 18 MG/DL (ref 7–18)
CALCIUM SERPL-MCNC: 9.7 MG/DL (ref 8.5–10.1)
CALCIUM SERPL-MCNC: 9.8 MG/DL (ref 8.5–10.1)
CHLORIDE SERPL-SCNC: 96 MMOL/L (ref 98–107)
CHLORIDE SERPL-SCNC: 97 MMOL/L (ref 98–107)
CHOLEST SERPL-MCNC: 295 MG/DL (ref ?–200)
CHOLEST/HDLC SERPL: 4.2 MG/DL
CO2 SERPL-SCNC: 24.5 MMOL/L (ref 21–32)
CO2 SERPL-SCNC: 24.7 MMOL/L (ref 21–32)
CREAT SERPL-MCNC: 1.7 MG/DL (ref 0.7–1.3)
CREAT SERPL-MCNC: 1.8 MG/DL (ref 0.7–1.3)
ERYTHROCYTE [DISTWIDTH] IN BLOOD BY AUTOMATED COUNT: 14.4 % (ref 11.5–14.5)
ERYTHROCYTE [DISTWIDTH] IN BLOOD BY AUTOMATED COUNT: 14.7 % (ref 11.5–14.5)
GFR SERPLBLD BASED ON 1.73 SQ M-ARVRAT: 48 ML/MIN
GFR SERPLBLD BASED ON 1.73 SQ M-ARVRAT: 52 ML/MIN
GLUCOSE SERPL-MCNC: 87 MG/DL (ref 74–106)
GLUCOSE SERPL-MCNC: 97 MG/DL (ref 74–106)
HDLC SERPL-MCNC: 71 MG/DL (ref 40–60)
LIPASE SERPL-CCNC: 195 IU/L (ref 73–393)
MAGNESIUM SERPL-MCNC: 2 MG/DL (ref 1.8–2.4)
PHOSPHATE SERPL-MCNC: 4.7 MG/DL (ref 2.5–4.9)
PLATELET # BLD: 187 X10^3MCL (ref 130–400)
PLATELET # BLD: 218 X10^3MCL (ref 130–400)
POTASSIUM SERPL-SCNC: 4.3 MMOL/L (ref 3.5–5.1)
POTASSIUM SERPL-SCNC: 4.9 MMOL/L (ref 3.5–5.1)
PROT SERPL-MCNC: 9.3 G/DL (ref 6.4–8.2)
SODIUM SERPL-SCNC: 132 MMOL/L (ref 136–145)
SODIUM SERPL-SCNC: 133 MMOL/L (ref 136–145)
T3 SERPL-MCNC: 1.45 NG/ML
T3RU NFR SERPL: 34 % UPTAKE (ref 33–39)
T4 FREE SERPL-MCNC: 1.51 NG/DL (ref 0.76–1.46)
T4 SERPL-MCNC: 10.1 UG/DL (ref 4.7–13.3)
T4/T3 UPTAKE INDEX SERPL: 3.4 UG/DL (ref 1.4–4.5)
TRIGL SERPL-MCNC: 150 MG/DL (ref ?–150)

## 2020-07-15 PROCEDURE — 06L38CZ OCCLUSION OF ESOPHAGEAL VEIN WITH EXTRALUMINAL DEVICE, VIA NATURAL OR ARTIFICIAL OPENING ENDOSCOPIC: ICD-10-PCS | Performed by: INTERNAL MEDICINE

## 2020-07-15 PROCEDURE — 0DB78ZX EXCISION OF STOMACH, PYLORUS, VIA NATURAL OR ARTIFICIAL OPENING ENDOSCOPIC, DIAGNOSTIC: ICD-10-PCS | Performed by: INTERNAL MEDICINE

## 2020-07-28 ENCOUNTER — HOSPITAL ENCOUNTER (EMERGENCY)
Dept: HOSPITAL 1 - ED | Age: 30
LOS: 1 days | Discharge: HOME | End: 2020-07-29
Payer: COMMERCIAL

## 2020-07-28 VITALS — BODY MASS INDEX: 26.6 KG/M2 | HEIGHT: 71 IN | WEIGHT: 190 LBS
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
## 2020-07-28 DIAGNOSIS — R11.2: ICD-10-CM

## 2020-07-28 DIAGNOSIS — N17.9: ICD-10-CM
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
## 2020-07-28 DIAGNOSIS — K74.60: ICD-10-CM

## 2020-07-28 DIAGNOSIS — E87.1: ICD-10-CM
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
## 2020-07-28 DIAGNOSIS — S37.009A: Primary | ICD-10-CM
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
## 2020-07-28 LAB
ALBUMIN SERPL-MCNC: 3.6 G/DL (ref 3.4–5)
ALP SERPL-CCNC: 177 U/L (ref 46–116)
ALT SERPL-CCNC: 50 U/L (ref 16–63)
AST SERPL-CCNC: 75 U/L (ref 15–37)
BASOPHILS NFR BLD: 0.4 % (ref 0–2)
BILIRUB DIRECT SERPL-MCNC: 3.11 MG/DL (ref 0–0.2)
BILIRUB SERPL-MCNC: 5.5 MG/DL (ref 0.2–1)
BUN SERPL-MCNC: 16 MG/DL (ref 7–18)
CALCIUM SERPL-MCNC: 9.5 MG/DL (ref 8.5–10.1)
CHLORIDE SERPL-SCNC: 91 MMOL/L (ref 98–107)
CO2 SERPL-SCNC: 26.4 MMOL/L (ref 21–32)
CREAT SERPL-MCNC: 2.1 MG/DL (ref 0.7–1.3)
ERYTHROCYTE [DISTWIDTH] IN BLOOD BY AUTOMATED COUNT: 12.9 % (ref 11.5–14.5)
GFR SERPLBLD BASED ON 1.73 SQ M-ARVRAT: 40 ML/MIN
GLUCOSE SERPL-MCNC: 104 MG/DL (ref 74–106)
LIPASE SERPL-CCNC: 201 IU/L (ref 73–393)
PLATELET # BLD: 256 X10^3MCL (ref 130–400)
POTASSIUM SERPL-SCNC: 4.1 MMOL/L (ref 3.5–5.1)
PROT SERPL-MCNC: 9.4 G/DL (ref 6.4–8.2)
SODIUM SERPL-SCNC: 129 MMOL/L (ref 136–145)

## 2020-07-29 VITALS — DIASTOLIC BLOOD PRESSURE: 69 MMHG | SYSTOLIC BLOOD PRESSURE: 115 MMHG

## 2020-09-09 ENCOUNTER — HOSPITAL ENCOUNTER (EMERGENCY)
Dept: HOSPITAL 1 - ED | Age: 30
LOS: 1 days | Discharge: HOME | End: 2020-09-10
Payer: COMMERCIAL

## 2020-09-09 VITALS — WEIGHT: 201.37 LBS | HEIGHT: 69 IN | BODY MASS INDEX: 29.83 KG/M2

## 2020-09-09 DIAGNOSIS — K70.9: ICD-10-CM

## 2020-09-09 DIAGNOSIS — R04.0: ICD-10-CM

## 2020-09-09 DIAGNOSIS — N18.9: ICD-10-CM

## 2020-09-09 DIAGNOSIS — K29.70: Primary | ICD-10-CM

## 2020-09-09 DIAGNOSIS — I12.9: ICD-10-CM

## 2020-09-10 VITALS — SYSTOLIC BLOOD PRESSURE: 124 MMHG | DIASTOLIC BLOOD PRESSURE: 70 MMHG

## 2020-09-10 LAB
ALBUMIN SERPL-MCNC: 3.5 G/DL (ref 3.4–5)
ALP SERPL-CCNC: 170 U/L (ref 46–116)
ALT SERPL-CCNC: 26 U/L (ref 16–63)
AST SERPL-CCNC: 45 U/L (ref 15–37)
BASOPHILS NFR BLD: 0.4 % (ref 0–2)
BILIRUB SERPL-MCNC: 3 MG/DL (ref 0.2–1)
BUN SERPL-MCNC: 9 MG/DL (ref 7–18)
CALCIUM SERPL-MCNC: 9.3 MG/DL (ref 8.5–10.1)
CHLORIDE SERPL-SCNC: 103 MMOL/L (ref 98–107)
CO2 SERPL-SCNC: 27.5 MMOL/L (ref 21–32)
CREAT SERPL-MCNC: 1.4 MG/DL (ref 0.7–1.3)
ERYTHROCYTE [DISTWIDTH] IN BLOOD BY AUTOMATED COUNT: 13.5 % (ref 11.5–14.5)
GFR SERPLBLD BASED ON 1.73 SQ M-ARVRAT: > 60 ML/MIN
GLUCOSE SERPL-MCNC: 90 MG/DL (ref 74–106)
LIPASE SERPL-CCNC: 197 IU/L (ref 73–393)
PLATELET # BLD: 122 X10^3MCL (ref 130–400)
POTASSIUM SERPL-SCNC: 4 MMOL/L (ref 3.5–5.1)
PROT SERPL-MCNC: 8 G/DL (ref 6.4–8.2)
SODIUM SERPL-SCNC: 136 MMOL/L (ref 136–145)

## 2020-10-01 ENCOUNTER — HOSPITAL ENCOUNTER (EMERGENCY)
Dept: HOSPITAL 1 - ED | Age: 30
Discharge: HOME | End: 2020-10-01
Payer: COMMERCIAL

## 2020-10-01 VITALS
HEIGHT: 69 IN | WEIGHT: 199 LBS | HEIGHT: 69 IN | BODY MASS INDEX: 29.47 KG/M2 | WEIGHT: 199 LBS | BODY MASS INDEX: 29.47 KG/M2

## 2020-10-01 VITALS — SYSTOLIC BLOOD PRESSURE: 101 MMHG | DIASTOLIC BLOOD PRESSURE: 62 MMHG

## 2020-10-01 DIAGNOSIS — Z90.49: ICD-10-CM

## 2020-10-01 DIAGNOSIS — I10: ICD-10-CM

## 2020-10-01 DIAGNOSIS — K74.60: Primary | ICD-10-CM

## 2020-10-01 LAB
ALBUMIN SERPL-MCNC: 3.5 G/DL (ref 3.4–5)
ALP SERPL-CCNC: 164 U/L (ref 46–116)
ALT SERPL-CCNC: 32 U/L (ref 16–63)
AST SERPL-CCNC: 44 U/L (ref 15–37)
BASOPHILS NFR BLD: 0.5 % (ref 0–2)
BILIRUB SERPL-MCNC: 3.57 MG/DL (ref 0.2–1)
BUN SERPL-MCNC: 12 MG/DL (ref 7–18)
CALCIUM SERPL-MCNC: 9.6 MG/DL (ref 8.5–10.1)
CHLORIDE SERPL-SCNC: 105 MMOL/L (ref 98–107)
CO2 SERPL-SCNC: 24.3 MMOL/L (ref 21–32)
CREAT SERPL-MCNC: 1.2 MG/DL (ref 0.7–1.3)
ERYTHROCYTE [DISTWIDTH] IN BLOOD BY AUTOMATED COUNT: 13.6 % (ref 11.5–14.5)
GFR SERPLBLD BASED ON 1.73 SQ M-ARVRAT: > 60 ML/MIN
GLUCOSE SERPL-MCNC: 93 MG/DL (ref 74–106)
LIPASE SERPL-CCNC: 116 IU/L (ref 73–393)
PLATELET # BLD: 131 X10^3MCL (ref 130–400)
POTASSIUM SERPL-SCNC: 3.5 MMOL/L (ref 3.5–5.1)
PROT SERPL-MCNC: 7.9 G/DL (ref 6.4–8.2)
SODIUM SERPL-SCNC: 139 MMOL/L (ref 136–145)

## 2020-12-04 ENCOUNTER — HOSPITAL ENCOUNTER (EMERGENCY)
Dept: HOSPITAL 1 - ED | Age: 30
Discharge: HOME | End: 2020-12-04
Payer: COMMERCIAL

## 2020-12-04 VITALS — DIASTOLIC BLOOD PRESSURE: 78 MMHG | SYSTOLIC BLOOD PRESSURE: 126 MMHG

## 2020-12-04 VITALS — HEIGHT: 69 IN | WEIGHT: 200 LBS | BODY MASS INDEX: 29.62 KG/M2

## 2020-12-04 DIAGNOSIS — Z20.828: ICD-10-CM

## 2020-12-04 DIAGNOSIS — K70.30: ICD-10-CM

## 2020-12-04 DIAGNOSIS — R43.9: Primary | ICD-10-CM

## 2020-12-04 DIAGNOSIS — Z90.49: ICD-10-CM

## 2020-12-04 DIAGNOSIS — I10: ICD-10-CM

## 2020-12-04 PROCEDURE — U0003 INFECTIOUS AGENT DETECTION BY NUCLEIC ACID (DNA OR RNA); SEVERE ACUTE RESPIRATORY SYNDROME CORONAVIRUS 2 (SARS-COV-2) (CORONAVIRUS DISEASE [COVID-19]), AMPLIFIED PROBE TECHNIQUE, MAKING USE OF HIGH THROUGHPUT TECHNOLOGIES AS DESCRIBED BY CMS-2020-01-R: HCPCS

## 2020-12-15 ENCOUNTER — HOSPITAL ENCOUNTER (EMERGENCY)
Dept: HOSPITAL 1 - ED | Age: 30
Discharge: HOME | End: 2020-12-15
Payer: COMMERCIAL

## 2020-12-15 VITALS — HEIGHT: 69 IN | BODY MASS INDEX: 29.62 KG/M2 | WEIGHT: 200 LBS

## 2020-12-15 VITALS — DIASTOLIC BLOOD PRESSURE: 75 MMHG | SYSTOLIC BLOOD PRESSURE: 135 MMHG

## 2020-12-15 DIAGNOSIS — I10: ICD-10-CM

## 2020-12-15 DIAGNOSIS — B34.9: ICD-10-CM

## 2020-12-15 DIAGNOSIS — U07.1: Primary | ICD-10-CM

## 2020-12-15 DIAGNOSIS — D72.819: ICD-10-CM

## 2020-12-15 DIAGNOSIS — K70.30: ICD-10-CM

## 2020-12-15 DIAGNOSIS — D69.6: ICD-10-CM

## 2020-12-15 LAB
ALBUMIN SERPL-MCNC: 4 G/DL (ref 3.4–5)
ALP SERPL-CCNC: 186 U/L (ref 46–116)
ALT SERPL-CCNC: 80 U/L (ref 16–63)
AST SERPL-CCNC: 239 U/L (ref 15–37)
BILIRUB SERPL-MCNC: 4.3 MG/DL (ref 0.2–1)
BUN SERPL-MCNC: 16 MG/DL (ref 7–18)
CALCIUM SERPL-MCNC: 8.8 MG/DL (ref 8.5–10.1)
CHLORIDE SERPL-SCNC: 99 MMOL/L (ref 98–107)
CO2 SERPL-SCNC: 24.1 MMOL/L (ref 21–32)
CREAT SERPL-MCNC: 1.6 MG/DL (ref 0.7–1.3)
ERYTHROCYTE [DISTWIDTH] IN BLOOD BY AUTOMATED COUNT: 15.2 % (ref 12.1–16.2)
GFR SERPLBLD BASED ON 1.73 SQ M-ARVRAT: 55 ML/MIN
GLUCOSE SERPL-MCNC: 86 MG/DL (ref 74–106)
LIPASE SERPL-CCNC: 144 IU/L (ref 73–393)
METAMYELOCYTES NFR BLD: 1 % (ref 0–2)
MONOCYTES NFR BLD: 9 % (ref 0–7)
NEUTS BAND NFR BLD: 3 % (ref 0–10)
NEUTS SEG NFR BLD MANUAL: 52 % (ref 37–75)
PLAT MORPH BLD: (no result)
PLATELET # BLD: 65 X10^3MCL (ref 152–348)
POTASSIUM SERPL-SCNC: 4.1 MMOL/L (ref 3.5–5.1)
PROT SERPL-MCNC: 9.3 G/DL (ref 6.4–8.2)
RBC MORPH BLD: NORMAL
SODIUM SERPL-SCNC: 135 MMOL/L (ref 136–145)

## 2020-12-15 PROCEDURE — U0003 INFECTIOUS AGENT DETECTION BY NUCLEIC ACID (DNA OR RNA); SEVERE ACUTE RESPIRATORY SYNDROME CORONAVIRUS 2 (SARS-COV-2) (CORONAVIRUS DISEASE [COVID-19]), AMPLIFIED PROBE TECHNIQUE, MAKING USE OF HIGH THROUGHPUT TECHNOLOGIES AS DESCRIBED BY CMS-2020-01-R: HCPCS
